# Patient Record
Sex: FEMALE | Race: WHITE | Employment: OTHER | URBAN - METROPOLITAN AREA
[De-identification: names, ages, dates, MRNs, and addresses within clinical notes are randomized per-mention and may not be internally consistent; named-entity substitution may affect disease eponyms.]

---

## 2017-02-09 ENCOUNTER — OP HISTORICAL/CONVERTED ENCOUNTER (OUTPATIENT)
Dept: OTHER | Age: 67
End: 2017-02-09

## 2017-05-18 ENCOUNTER — HOSPITAL ENCOUNTER (OUTPATIENT)
Dept: PREADMISSION TESTING | Age: 67
Discharge: HOME | End: 2017-05-18
Payer: MEDICARE

## 2017-05-18 VITALS
TEMPERATURE: 98.2 F | HEIGHT: 66 IN | SYSTOLIC BLOOD PRESSURE: 123 MMHG | HEART RATE: 72 BPM | DIASTOLIC BLOOD PRESSURE: 63 MMHG | OXYGEN SATURATION: 97 % | WEIGHT: 186.29 LBS | RESPIRATION RATE: 16 BRPM | BODY MASS INDEX: 29.94 KG/M2

## 2017-05-18 LAB
ABO + RH BLD: NORMAL
ALBUMIN SERPL BCP-MCNC: 4 G/DL (ref 3.5–5)
ALBUMIN/GLOB SERPL: 1.2 {RATIO} (ref 1.1–2.2)
ALP SERPL-CCNC: 56 U/L (ref 45–117)
ALT SERPL-CCNC: 21 U/L (ref 12–78)
ANION GAP BLD CALC-SCNC: 10 MMOL/L (ref 5–15)
APPEARANCE UR: CLEAR
APTT PPP: 34 SEC (ref 22.1–32.5)
AST SERPL W P-5'-P-CCNC: 15 U/L (ref 15–37)
ATRIAL RATE: 234 BPM
BACTERIA URNS QL MICRO: NEGATIVE /HPF
BASOPHILS # BLD AUTO: 0 K/UL (ref 0–0.1)
BASOPHILS # BLD: 1 % (ref 0–1)
BILIRUB SERPL-MCNC: 0.4 MG/DL (ref 0.2–1)
BILIRUB UR QL: NEGATIVE
BLOOD GROUP ANTIBODIES SERPL: NORMAL
BUN SERPL-MCNC: 15 MG/DL (ref 6–20)
BUN/CREAT SERPL: 20 (ref 12–20)
CALCIUM SERPL-MCNC: 9.3 MG/DL (ref 8.5–10.1)
CALCULATED R AXIS, ECG10: 40 DEGREES
CALCULATED T AXIS, ECG11: 107 DEGREES
CHLORIDE SERPL-SCNC: 103 MMOL/L (ref 97–108)
CO2 SERPL-SCNC: 27 MMOL/L (ref 21–32)
COLOR UR: NORMAL
CREAT SERPL-MCNC: 0.75 MG/DL (ref 0.55–1.02)
DIAGNOSIS, 93000: NORMAL
EOSINOPHIL # BLD: 0.2 K/UL (ref 0–0.4)
EOSINOPHIL NFR BLD: 2 % (ref 0–7)
EPITH CASTS URNS QL MICRO: NORMAL /LPF
ERYTHROCYTE [DISTWIDTH] IN BLOOD BY AUTOMATED COUNT: 15.5 % (ref 11.5–14.5)
EST. AVERAGE GLUCOSE BLD GHB EST-MCNC: 143 MG/DL
GLOBULIN SER CALC-MCNC: 3.4 G/DL (ref 2–4)
GLUCOSE SERPL-MCNC: 87 MG/DL (ref 65–100)
GLUCOSE UR STRIP.AUTO-MCNC: NEGATIVE MG/DL
HBA1C MFR BLD: 6.6 % (ref 4.2–6.3)
HCT VFR BLD AUTO: 43.6 % (ref 35–47)
HGB BLD-MCNC: 13.9 G/DL (ref 11.5–16)
HGB UR QL STRIP: NEGATIVE
HYALINE CASTS URNS QL MICRO: NORMAL /LPF (ref 0–5)
INR PPP: 2.3 (ref 0.9–1.1)
KETONES UR QL STRIP.AUTO: NEGATIVE MG/DL
LEUKOCYTE ESTERASE UR QL STRIP.AUTO: NEGATIVE
LYMPHOCYTES # BLD AUTO: 36 % (ref 12–49)
LYMPHOCYTES # BLD: 2.9 K/UL (ref 0.8–3.5)
MCH RBC QN AUTO: 29.1 PG (ref 26–34)
MCHC RBC AUTO-ENTMCNC: 31.9 G/DL (ref 30–36.5)
MCV RBC AUTO: 91.2 FL (ref 80–99)
MONOCYTES # BLD: 0.6 K/UL (ref 0–1)
MONOCYTES NFR BLD AUTO: 7 % (ref 5–13)
NEUTS SEG # BLD: 4.5 K/UL (ref 1.8–8)
NEUTS SEG NFR BLD AUTO: 54 % (ref 32–75)
NITRITE UR QL STRIP.AUTO: NEGATIVE
PH UR STRIP: 5 [PH] (ref 5–8)
PLATELET # BLD AUTO: 376 K/UL (ref 150–400)
POTASSIUM SERPL-SCNC: 4.9 MMOL/L (ref 3.5–5.1)
PROT SERPL-MCNC: 7.4 G/DL (ref 6.4–8.2)
PROT UR STRIP-MCNC: NEGATIVE MG/DL
PROTHROMBIN TIME: 23.4 SEC (ref 9–11.1)
Q-T INTERVAL, ECG07: 382 MS
QRS DURATION, ECG06: 86 MS
QTC CALCULATION (BEZET), ECG08: 432 MS
RBC # BLD AUTO: 4.78 M/UL (ref 3.8–5.2)
RBC #/AREA URNS HPF: NORMAL /HPF (ref 0–5)
SODIUM SERPL-SCNC: 140 MMOL/L (ref 136–145)
SP GR UR REFRACTOMETRY: 1.02 (ref 1–1.03)
SPECIMEN EXP DATE BLD: NORMAL
THERAPEUTIC RANGE,PTTT: ABNORMAL SECS (ref 58–77)
UA: UC IF INDICATED,UAUC: NORMAL
UROBILINOGEN UR QL STRIP.AUTO: 0.2 EU/DL (ref 0.2–1)
VENTRICULAR RATE, ECG03: 77 BPM
WBC # BLD AUTO: 8.3 K/UL (ref 3.6–11)
WBC URNS QL MICRO: NORMAL /HPF (ref 0–4)

## 2017-05-18 PROCEDURE — 85730 THROMBOPLASTIN TIME PARTIAL: CPT | Performed by: ORTHOPAEDIC SURGERY

## 2017-05-18 PROCEDURE — 36415 COLL VENOUS BLD VENIPUNCTURE: CPT | Performed by: ORTHOPAEDIC SURGERY

## 2017-05-18 PROCEDURE — 81001 URINALYSIS AUTO W/SCOPE: CPT | Performed by: ORTHOPAEDIC SURGERY

## 2017-05-18 PROCEDURE — 86900 BLOOD TYPING SEROLOGIC ABO: CPT | Performed by: ORTHOPAEDIC SURGERY

## 2017-05-18 PROCEDURE — 80053 COMPREHEN METABOLIC PANEL: CPT | Performed by: ORTHOPAEDIC SURGERY

## 2017-05-18 PROCEDURE — 83036 HEMOGLOBIN GLYCOSYLATED A1C: CPT | Performed by: ORTHOPAEDIC SURGERY

## 2017-05-18 PROCEDURE — 93005 ELECTROCARDIOGRAM TRACING: CPT

## 2017-05-18 PROCEDURE — 85025 COMPLETE CBC W/AUTO DIFF WBC: CPT | Performed by: ORTHOPAEDIC SURGERY

## 2017-05-18 PROCEDURE — 85610 PROTHROMBIN TIME: CPT | Performed by: ORTHOPAEDIC SURGERY

## 2017-05-18 RX ORDER — ATORVASTATIN CALCIUM 40 MG/1
40 TABLET, FILM COATED ORAL DAILY
COMMUNITY

## 2017-05-18 RX ORDER — OXYCODONE AND ACETAMINOPHEN 10; 325 MG/1; MG/1
TABLET ORAL
COMMUNITY
End: 2017-06-10

## 2017-05-18 RX ORDER — HYDROCODONE BITARTRATE 30 MG/1
30 TABLET, EXTENDED RELEASE ORAL DAILY
Status: ON HOLD | COMMUNITY
End: 2017-06-10

## 2017-05-18 RX ORDER — DIGOXIN 125 MCG
0.12 TABLET ORAL DAILY
COMMUNITY
End: 2021-02-25 | Stop reason: ALTCHOICE

## 2017-05-18 RX ORDER — EPINEPHRINE 0.3 MG/.3ML
0.3 INJECTION SUBCUTANEOUS
COMMUNITY

## 2017-05-18 RX ORDER — WARFARIN SODIUM 5 MG/1
5 TABLET ORAL
COMMUNITY
End: 2021-02-25 | Stop reason: ALTCHOICE

## 2017-05-18 NOTE — H&P
PAT Pre-Op History & Physical    Patient: Mirian Garcia                  MRN: [id number]          SSN: [id number]  YOB: 1950          Age: 69 y.o. Sex: female                Subjective:   Patient is a 69 y.o.  female who presents with history of chronic right shoulder pain that has been a problem for 3-4 years per patient report. Denies any trauma or injury that preceded the onset of her pain. Rates right shoulder pain 8/10 and states that it radiates into her upper right chest at times. Describes pain as a constant ache but can be sharp if she tries to raise her right arm. Patient is right hand dominant. Has failed steroid joint injections, NSAIDs, narcotic pain medication,and various topical medications (Lidocaine patches, \"tiger balm\", etc). Had left shoulder replaced in 8/2016 with good results. The patient was evaluated in the surgeon's office and it was determined that the most appropriate plan of care is to proceed with surgical intervention.   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 Authorizing Provider   oxyCODONE-acetaminophen (PERCOCET 10)  mg per tablet Take  by mouth every eight (8) hours as needed for Pain. Yes Historical Provider   atorvastatin (LIPITOR) 40 mg tablet Take 40 mg by mouth daily. Yes Historical Provider   warfarin (COUMADIN) 5 mg tablet Take 5 mg by mouth every Tuesday, Thursday, Saturday & Sunday. Yes Historical Provider   EPINEPHrine (EPIPEN) 0.3 mg/0.3 mL injection 0.3 mg by IntraMUSCular route once as needed. Yes Historical Provider   digoxin (DIGITEK) 0.125 mg tablet Take 0.125 mg by mouth daily. Yes Historical Provider   HYDROcodone bitartrate (HYSINGLA ER) 30 mg ER tablet Take 30 mg by mouth daily. *DO NOT CRUSH,CHEW, OR DISSOLVE TABLET*   Yes Historical Provider   WARFARIN SODIUM (COUMADIN PO) Take 6 mg by mouth every Monday, Wednesday, Friday. Yes Historical Provider   aspirin delayed-release 81 mg tablet Take 81 mg by mouth daily. Pt states she frequently does not take this medicine   Yes Historical Provider   b complex vitamins tablet Take 1 Tab by mouth daily. Yes Historical Provider   atenolol (TENORMIN) 50 mg tablet Take 50 mg by mouth daily. Indications: VENTRICULAR RATE CONTROL IN ATRIAL FIBRILLATION   Yes Historical Provider   diltiazem XR (DILACOR XR) 120 mg XR capsule Take  by mouth two (2) times a day. Yes Historical Provider   fenofibrate micronized (LOFIBRA) 200 mg capsule Take  by mouth nightly. Yes Historical Provider   metFORMIN (GLUCOPHAGE) 500 mg tablet Take 1,000 mg by mouth two (2) times daily (with meals). Yes Historical Provider   nitroglycerin (NITROSTAT) 0.4 mg SL tablet by SubLINGual route every five (5) minutes as needed for Chest Pain. Yes Historical Provider   coenzyme q10 10 mg cap Take 1 Cap by mouth daily.     Historical Provider HYDROcodone-acetaminophen (NORCO)  mg tablet Take 1 Tab by mouth every twelve (12) hours as needed for Pain. Historical Provider     Current Outpatient Prescriptions   Medication Sig    oxyCODONE-acetaminophen (PERCOCET 10)  mg per tablet Take  by mouth every eight (8) hours as needed for Pain.  atorvastatin (LIPITOR) 40 mg tablet Take 40 mg by mouth daily.  warfarin (COUMADIN) 5 mg tablet Take 5 mg by mouth every Tuesday, Thursday, Saturday & Sunday.  EPINEPHrine (EPIPEN) 0.3 mg/0.3 mL injection 0.3 mg by IntraMUSCular route once as needed.  digoxin (DIGITEK) 0.125 mg tablet Take 0.125 mg by mouth daily.  HYDROcodone bitartrate (HYSINGLA ER) 30 mg ER tablet Take 30 mg by mouth daily. *DO NOT CRUSH,CHEW, OR DISSOLVE TABLET*    WARFARIN SODIUM (COUMADIN PO) Take 6 mg by mouth every Monday, Wednesday, Friday.  aspirin delayed-release 81 mg tablet Take 81 mg by mouth daily. Pt states she frequently does not take this medicine    b complex vitamins tablet Take 1 Tab by mouth daily.  atenolol (TENORMIN) 50 mg tablet Take 50 mg by mouth daily. Indications: VENTRICULAR RATE CONTROL IN ATRIAL FIBRILLATION    diltiazem XR (DILACOR XR) 120 mg XR capsule Take  by mouth two (2) times a day.  fenofibrate micronized (LOFIBRA) 200 mg capsule Take  by mouth nightly.  metFORMIN (GLUCOPHAGE) 500 mg tablet Take 1,000 mg by mouth two (2) times daily (with meals).  nitroglycerin (NITROSTAT) 0.4 mg SL tablet by SubLINGual route every five (5) minutes as needed for Chest Pain.  coenzyme q10 10 mg cap Take 1 Cap by mouth daily.  HYDROcodone-acetaminophen (NORCO)  mg tablet Take 1 Tab by mouth every twelve (12) hours as needed for Pain. No current facility-administered medications for this encounter.        Allergies   Allergen Reactions    Bee Sting [Sting, Bee] Anaphylaxis     Carries an epi pen    Ciprofloxacin Hives, Rash and Itching    Flagyl [Metronidazole] Rash and Nausea and Vomiting    Pcn [Penicillins] Rash     Pt states she can take keflex without problem      Social History   Substance Use Topics    Smoking status: Never Smoker    Smokeless tobacco: Never Used    Alcohol use No      History   Drug Use No     Family History   Problem Relation Age of Onset    Dementia Mother     Heart Disease Mother     Coronary Artery Disease Mother      had open heart surgery    Diabetes Mother     Heart Disease Father     Heart Disease Maternal Grandmother     Heart Disease Maternal Grandfather     Heart Disease Paternal Grandmother     Heart Disease Paternal Grandfather     Heart Disease Brother          Review of Systems    Patient denies difficulty swallowing, mouth sores, or loose teeth. Patient denies any recent dental procedures or any planned prior to surgery. Patient denies chest pain, tightness, pain radiating down left arm, palpitations. Denies dizziness, visual disturbances, or lightheadedness. Patient denies shortness of breath, wheezing, cough, fever, or chills. Patient denies diarrhea, constipation, or abdominal pain. Patient denies urinary problems including dysuria, hesitancy, urgency, or incontinence. Denies skin breakdown, rashes, insect bites or open area. C/o right shoulder pain. Objective:   Patient Vitals for the past 24 hrs:   Temp Pulse Resp BP SpO2   17 1258 98.2 °F (36.8 °C) 72 16 123/63 97 %     Temp (24hrs), Av.2 °F (36.8 °C), Min:98.2 °F (36.8 °C), Max:98.2 °F (36.8 °C)    Body mass index is 30.07 kg/(m^2). Wt Readings from Last 1 Encounters:   17 84.5 kg (186 lb 4.6 oz)        Physical Exam:     General: Pleasant,  cooperative, no apparent distress, appears stated age. Eyes: Conjunctivae/corneas clear. EOMs intact. Nose: Nares normal.   Mouth/Throat: Lips, mucosa, and tongue normal. Missing multiple back teeth- gums normal.   Neck: Supple, symmetrical, trachea midline.     Back: Symmetric   Lungs: Clear to auscultation bilaterally. Heart: Regular rate and rhythm, S1, S2 normal. No murmur, click, rub or gallop. Abdomen: Soft, non-tender. Bowel sounds normal. No distention. Musculoskeletal:  Right shoulder ROM limited by discomfort. Extremities:  Extremities normal, atraumatic, no cyanosis or edema. Calves                                 supple, non tender to palpation. Pulses: 2+ and symmetric bilateral upper extremities. Cap. refill <2 seconds   Skin: Skin color, texture, turgor normal.  No rashes or lesions. Neurologic: CN II-XII grossly intact. Alert and oriented x3. Labs:   Recent Results (from the past 72 hour(s))   CBC WITH AUTOMATED DIFF    Collection Time: 05/18/17  2:49 PM   Result Value Ref Range    WBC 8.3 3.6 - 11.0 K/uL    RBC 4.78 3.80 - 5.20 M/uL    HGB 13.9 11.5 - 16.0 g/dL    HCT 43.6 35.0 - 47.0 %    MCV 91.2 80.0 - 99.0 FL    MCH 29.1 26.0 - 34.0 PG    MCHC 31.9 30.0 - 36.5 g/dL    RDW 15.5 (H) 11.5 - 14.5 %    PLATELET 699 990 - 353 K/uL    NEUTROPHILS 54 32 - 75 %    LYMPHOCYTES 36 12 - 49 %    MONOCYTES 7 5 - 13 %    EOSINOPHILS 2 0 - 7 %    BASOPHILS 1 0 - 1 %    ABS. NEUTROPHILS 4.5 1.8 - 8.0 K/UL    ABS. LYMPHOCYTES 2.9 0.8 - 3.5 K/UL    ABS. MONOCYTES 0.6 0.0 - 1.0 K/UL    ABS. EOSINOPHILS 0.2 0.0 - 0.4 K/UL    ABS. BASOPHILS 0.0 0.0 - 0.1 K/UL   METABOLIC PANEL, COMPREHENSIVE    Collection Time: 05/18/17  2:49 PM   Result Value Ref Range    Sodium 140 136 - 145 mmol/L    Potassium 4.9 3.5 - 5.1 mmol/L    Chloride 103 97 - 108 mmol/L    CO2 27 21 - 32 mmol/L    Anion gap 10 5 - 15 mmol/L    Glucose 87 65 - 100 mg/dL    BUN 15 6 - 20 MG/DL    Creatinine 0.75 0.55 - 1.02 MG/DL    BUN/Creatinine ratio 20 12 - 20      GFR est AA >60 >60 ml/min/1.73m2    GFR est non-AA >60 >60 ml/min/1.73m2    Calcium 9.3 8.5 - 10.1 MG/DL    Bilirubin, total 0.4 0.2 - 1.0 MG/DL    ALT (SGPT) 21 12 - 78 U/L    AST (SGOT) 15 15 - 37 U/L    Alk.  phosphatase 56 45 - 117 U/L    Protein, total 7.4 6.4 - 8.2 g/dL    Albumin 4.0 3.5 - 5.0 g/dL    Globulin 3.4 2.0 - 4.0 g/dL    A-G Ratio 1.2 1.1 - 2.2     HEMOGLOBIN A1C WITH EAG    Collection Time: 05/18/17  2:49 PM   Result Value Ref Range    Hemoglobin A1c 6.6 (H) 4.2 - 6.3 %    Est. average glucose 143 mg/dL   CULTURE, MRSA    Collection Time: 05/18/17  2:49 PM   Result Value Ref Range    Special Requests: NO SPECIAL REQUESTS      Culture result: MRSA NOT PRESENT   AT 14 HOURS       PROTHROMBIN TIME + INR    Collection Time: 05/18/17  2:49 PM   Result Value Ref Range    INR 2.3 (H) 0.9 - 1.1      Prothrombin time 23.4 (H) 9.0 - 11.1 sec   PTT    Collection Time: 05/18/17  2:49 PM   Result Value Ref Range    aPTT 34.0 (H) 22.1 - 32.5 sec    aPTT, therapeutic range     58.0 - 77.0 SECS   URINALYSIS W/ REFLEX CULTURE    Collection Time: 05/18/17  2:49 PM   Result Value Ref Range    Color YELLOW/STRAW      Appearance CLEAR CLEAR      Specific gravity 1.017 1.003 - 1.030      pH (UA) 5.0 5.0 - 8.0      Protein NEGATIVE  NEG mg/dL    Glucose NEGATIVE  NEG mg/dL    Ketone NEGATIVE  NEG mg/dL    Bilirubin NEGATIVE  NEG      Blood NEGATIVE  NEG      Urobilinogen 0.2 0.2 - 1.0 EU/dL    Nitrites NEGATIVE  NEG      Leukocyte Esterase NEGATIVE  NEG      WBC 0-4 0 - 4 /hpf    RBC 0-5 0 - 5 /hpf    Epithelial cells FEW FEW /lpf    Bacteria NEGATIVE  NEG /hpf    UA:UC IF INDICATED CULTURE NOT INDICATED BY UA RESULT CNI      Hyaline cast 0-2 0 - 5 /lpf   TYPE & SCREEN    Collection Time: 05/18/17  2:49 PM   Result Value Ref Range    Crossmatch Expiration 06/01/2017     ABO/Rh(D) A POSITIVE     Antibody screen NEG    EKG, 12 LEAD, INITIAL    Collection Time: 05/18/17  3:06 PM   Result Value Ref Range    Ventricular Rate 77 BPM    Atrial Rate 234 BPM    QRS Duration 86 ms    Q-T Interval 382 ms    QTC Calculation (Bezet) 432 ms    Calculated R Axis 40 degrees    Calculated T Axis 107 degrees    Diagnosis       Atrial fibrillation with premature ventricular or aberrantly conducted complexes  Nonspecific ST and T wave abnormality  Abnormal ECG  When compared with ECG of 27-JUL-2016 11:55,  Atrial fibrillation has replaced Sinus rhythm  Minor nonspecific st&t changes  Confirmed by Robby Mars M.D., Yossi James ([id number]) on 5/18/2017 4:30:42 PM         Assessment:     Right shoulder OA. Plan:     Scheduled for right total replacement. Labs and EKG done per surgeon's orders. Labs reviewed- unremarkable except PT/INR/PTT elevated but this expected as patient still on Coumadin. EKG shows a fib but patient has history of a fib and rate is controlled. MRSA positive- patient notified of results and RX sent to pharmacy. Received copy of note from PCP- Dr. Roderick Green- that gives instructions to stop Coumadin 5 days pre op and Aspirin 7 days pre op. Note also states that patient was cleared by cardiology. Requested cardiology note and clearance from office of Dr. Thomas Malagon in [place]. Copy of clearance on chart. Repeat PT/PTT/INR on day of surgery as patient on Coumadin.       Rosa Dumont NP

## 2017-05-18 NOTE — PERIOP NOTES
[facility]  PREOPERATIVE INSTRUCTIONS    Surgery Date:  Friday June 9   Surgery arrival time given by surgeon: NO   If no,[facility] [address] staff will call you between 4 PM- 8 PM the day before surgery with your arrival time. If your surgery is on a Monday, we will call you the preceding Friday. Please call [phone] after 8 PM if you did not receive your arrival time. 1. Please report at the designated time to the [address] [facility]. Bring your insurance card, photo identification, and any copayment ( if applicable). 2. You must have a responsible adult to drive you home. You need to have a responsible adult to stay with you the first 24 hours after surgery if you are going home the same day of your surgery and you should not drive a car for 24 hours following your surgery. 3. Nothing to eat or drink after midnight the night before surgery. This includes no water, gum, mints, coffee, juice, etc.  Please note special instructions, if applicable, below for medications. 4. MEDICATIONS TO TAKE THE MORNING OF SURGERY WITH A SIP OF WATER: diltiazem, digitek,oxycodone and other pain meds and atenolol  5. You MAY take your pain medication the day of surgery with a sip of water. 6. No alcoholic beverages 24 hours before or after your surgery. 7. If you are being admitted to the hospital,please leave personal belongings/luggage in your car until you have an assigned hospital room number. 8. Stop Aspirin and/or any non-steroidal anti-inflammatory drugs (i.e. Ibuprofen, Naproxen, Advil, Aleve) as directed by your prescribing physician. You may take Tylenol. Stop herbal supplements 1 week prior to  surgery. 9. If you are currently taking Plavix, Coumadin,or any other blood-thinning/anticoagulant medication contact your prescribing physician for instructions. 10. Please wear comfortable clothes. Wear your glasses instead of contacts. We ask that all money, jewelry and valuables be left at home.  Wear no make up, particularly hieu, the day of surgery. 11.  All body piercings, rings,and jewelry need to be removed and left at home. Please wear your hair loose or down. Please no pony-tails, buns, or any metal hair accessories. If you shower the morning of surgery, please do not apply any lotions, powders, or deodorants afterwards. Do not shave any body area within 24 hours of your surgery. 12. Please follow all instructions to avoid any potential surgical cancellation. 13. Should your physical condition change, (i.e. fever, cold, flu, etc.) please notify your surgeon as soon as possible. 14. It is important to be on time. If a situation occurs where you may be delayed, please call:  [phone] / [phone] on the day of surgery. 15. The Preadmission Testing staff can be reached at [phone]. 16. Special Instructions:  · Use Chlorhexidine Care Fusion wash and sponges 3 days prior to surgery as instructed. · Incentive spirometer given with instructions to practice at home and bring back to the hospital on the day of surgery. · [facility] will contact you if your Hemoglobin A1C is greater than 7.5. · Ensure/Glucerna  sample, nutritional information, and Ensure/Glucerna coupon given. · Pain pamphlet and Call Don't Fall reminder reviewed with patient. ·  parking is complimentary Monday - Friday 7 am - 5 pm  · Bring PTA Medication list day of surgery with the last doses taken documented  The patient was contacted  in person. She  verbalize  understanding of all instructions does not  need reinforcement.

## 2017-05-19 ENCOUNTER — ED HISTORICAL/CONVERTED ENCOUNTER (OUTPATIENT)
Dept: OTHER | Age: 67
End: 2017-05-19

## 2017-05-20 ENCOUNTER — OP HISTORICAL/CONVERTED ENCOUNTER (OUTPATIENT)
Dept: OTHER | Age: 67
End: 2017-05-20

## 2017-05-20 LAB
BACTERIA SPEC CULT: ABNORMAL
SERVICE CMNT-IMP: ABNORMAL

## 2017-05-22 RX ORDER — MUPIROCIN 20 MG/G
OINTMENT TOPICAL
Qty: 22 G | Refills: 0 | Status: SHIPPED | OUTPATIENT
Start: 2017-05-22 | End: 2021-02-25 | Stop reason: ALTCHOICE

## 2017-05-22 NOTE — PROGRESS NOTES
Notification of Positive MRSA and Treatment Ordered by Preadmission Testing Nurse Practitioner      Patient Name:  Izabela Mota     MRN: [id number]   : 1950  Surgeon:Dr. Jeanie Fitch  Date of surgery: 2017  Procedure: Right total shoulder arthroplasty    Allergies: Allergies   Allergen Reactions    Bee Sting [Sting, Bee] Anaphylaxis     Carries an epi pen    Ciprofloxacin Hives, Rash and Itching    Flagyl [Metronidazole] Rash and Nausea and Vomiting    Pcn [Penicillins] Rash     Pt states she can take keflex without problem       MRSA results: All Micro Results     Procedure Component Value Units Date/Time    MRSA CULTURE NARES [[id number]]  (Abnormal) Collected:  17 1449    Order Status:  Completed Specimen:  Nares Updated:  17 1302     Special Requests: NO SPECIAL REQUESTS        Culture result: MRSA PRESENT (A)         REPORT OF  MRSA PRESENT  CALLED TO AND READ BACK BY  DR. Uliess Calderón ON 17 AT 1300 BY VI   (A)                   Treatment ordered: Bactroban ointment 2%- apply intranasal twice daily for 5 days    Pre operative antibiotic will be changed to Vancomycin using weight based dosing (<80kg = 1gm, >80kg = 1.5gm) unless patient has allergy to Vancomycin.     Date patient notified of results / treatment prescribed: 2017    The Patient was instructed to add medication and date they began treatment to their \"Prior to Admission Medication\" list given to them in [address] [place] [address],

## 2017-05-23 ENCOUNTER — HOSPITAL ENCOUNTER (OUTPATIENT)
Dept: CT IMAGING | Age: 67
Discharge: HOME | End: 2017-05-23
Attending: ORTHOPAEDIC SURGERY
Payer: MEDICARE

## 2017-05-23 DIAGNOSIS — M19.011 ARTHRITIS OF RIGHT SHOULDER REGION: ICD-10-CM

## 2017-05-23 PROCEDURE — 73200 CT UPPER EXTREMITY W/O DYE: CPT

## 2017-06-07 RX ORDER — VANCOMYCIN/0.9 % SOD CHLORIDE 1.5G/250ML
1500 PLASTIC BAG, INJECTION (ML) INTRAVENOUS ONCE
Status: CANCELLED | OUTPATIENT
Start: 2017-06-09 | End: 2017-06-09

## 2017-06-08 ENCOUNTER — ANESTHESIA EVENT (OUTPATIENT)
Dept: SURGERY | Age: 67
DRG: 483 | End: 2017-06-08
Payer: MEDICARE

## 2017-06-09 ENCOUNTER — HOSPITAL ENCOUNTER (INPATIENT)
Age: 67
LOS: 1 days | Discharge: HOME | DRG: 483 | End: 2017-06-10
Attending: ORTHOPAEDIC SURGERY | Admitting: ORTHOPAEDIC SURGERY
Payer: MEDICARE

## 2017-06-09 ENCOUNTER — ANESTHESIA (OUTPATIENT)
Dept: SURGERY | Age: 67
DRG: 483 | End: 2017-06-09
Payer: MEDICARE

## 2017-06-09 ENCOUNTER — APPOINTMENT (OUTPATIENT)
Dept: GENERAL RADIOLOGY | Age: 67
DRG: 483 | End: 2017-06-09
Attending: PHYSICIAN ASSISTANT
Payer: MEDICARE

## 2017-06-09 PROBLEM — I48.20 CHRONIC A-FIB (HCC): Status: ACTIVE | Noted: 2017-06-09

## 2017-06-09 PROBLEM — N13.5 RETROPERITONEAL FIBROSIS: Status: ACTIVE | Noted: 2017-06-09

## 2017-06-09 PROBLEM — E11.9 DM (DIABETES MELLITUS) (HCC): Status: ACTIVE | Noted: 2017-06-09

## 2017-06-09 PROBLEM — M19.90 OSTEOARTHRITIS: Status: ACTIVE | Noted: 2017-06-09

## 2017-06-09 PROBLEM — I71.40 AAA (ABDOMINAL AORTIC ANEURYSM): Status: ACTIVE | Noted: 2017-06-09

## 2017-06-09 LAB
ABO + RH BLD: NORMAL
BLOOD GROUP ANTIBODIES SERPL: NORMAL
GLUCOSE BLD STRIP.AUTO-MCNC: 118 MG/DL (ref 65–100)
GLUCOSE BLD STRIP.AUTO-MCNC: 138 MG/DL (ref 65–100)
GLUCOSE BLD STRIP.AUTO-MCNC: 209 MG/DL (ref 65–100)
GLUCOSE BLD STRIP.AUTO-MCNC: 212 MG/DL (ref 65–100)
SERVICE CMNT-IMP: ABNORMAL
SPECIMEN EXP DATE BLD: NORMAL

## 2017-06-09 PROCEDURE — 77030020268 HC MISC GENERAL SUPPLY: Performed by: ORTHOPAEDIC SURGERY

## 2017-06-09 PROCEDURE — 77030018836 HC SOL IRR NACL ICUM -A: Performed by: ORTHOPAEDIC SURGERY

## 2017-06-09 PROCEDURE — 77030031139 HC SUT VCRL2 J&J -A: Performed by: ORTHOPAEDIC SURGERY

## 2017-06-09 PROCEDURE — 64415 NJX AA&/STRD BRCH PLXS IMG: CPT

## 2017-06-09 PROCEDURE — 77030032490 HC SLV COMPR SCD KNE COVD -B: Performed by: ORTHOPAEDIC SURGERY

## 2017-06-09 PROCEDURE — 36415 COLL VENOUS BLD VENIPUNCTURE: CPT | Performed by: ORTHOPAEDIC SURGERY

## 2017-06-09 PROCEDURE — 74011250636 HC RX REV CODE- 250/636: Performed by: PHYSICIAN ASSISTANT

## 2017-06-09 PROCEDURE — 76210000035 HC AMBSU PH I REC 1 TO 1.5 HR: Performed by: ORTHOPAEDIC SURGERY

## 2017-06-09 PROCEDURE — 76060000065 HC AMB SURG ANES 2.5 TO 3 HR: Performed by: ORTHOPAEDIC SURGERY

## 2017-06-09 PROCEDURE — 74011250636 HC RX REV CODE- 250/636

## 2017-06-09 PROCEDURE — 77030010507 HC ADH SKN DERMBND J&J -B

## 2017-06-09 PROCEDURE — 74011000250 HC RX REV CODE- 250: Performed by: ORTHOPAEDIC SURGERY

## 2017-06-09 PROCEDURE — 77030012429: Performed by: ORTHOPAEDIC SURGERY

## 2017-06-09 PROCEDURE — 77030010785: Performed by: ORTHOPAEDIC SURGERY

## 2017-06-09 PROCEDURE — 77030002922 HC SUT FBRWRE ARTH -B: Performed by: ORTHOPAEDIC SURGERY

## 2017-06-09 PROCEDURE — 74011000250 HC RX REV CODE- 250: Performed by: ANESTHESIOLOGY

## 2017-06-09 PROCEDURE — 77030003882 HC BIT DRL TWST BRSM -B: Performed by: ORTHOPAEDIC SURGERY

## 2017-06-09 PROCEDURE — 77030020788: Performed by: ORTHOPAEDIC SURGERY

## 2017-06-09 PROCEDURE — 74011250637 HC RX REV CODE- 250/637: Performed by: PHYSICIAN ASSISTANT

## 2017-06-09 PROCEDURE — 77030032497 HC WRP SHLDR WO BGS SOLM -B

## 2017-06-09 PROCEDURE — 77030011264 HC ELECTRD BLD EXT COVD -A: Performed by: ORTHOPAEDIC SURGERY

## 2017-06-09 PROCEDURE — 74011636637 HC RX REV CODE- 636/637: Performed by: INTERNAL MEDICINE

## 2017-06-09 PROCEDURE — 86900 BLOOD TYPING SEROLOGIC ABO: CPT | Performed by: ORTHOPAEDIC SURGERY

## 2017-06-09 PROCEDURE — 76030000022 HC AMB SURG 2.5 TO 3 HR INTENSV-TIER 1: Performed by: ORTHOPAEDIC SURGERY

## 2017-06-09 PROCEDURE — 73020 X-RAY EXAM OF SHOULDER: CPT

## 2017-06-09 PROCEDURE — 77030002933 HC SUT MCRYL J&J -A: Performed by: ORTHOPAEDIC SURGERY

## 2017-06-09 PROCEDURE — 74011250636 HC RX REV CODE- 250/636: Performed by: ANESTHESIOLOGY

## 2017-06-09 PROCEDURE — 77030010507 HC ADH SKN DERMBND J&J -B: Performed by: ORTHOPAEDIC SURGERY

## 2017-06-09 PROCEDURE — 0RRJ0JZ REPLACEMENT OF RIGHT SHOULDER JOINT WITH SYNTHETIC SUBSTITUTE, OPEN APPROACH: ICD-10-PCS | Performed by: ORTHOPAEDIC SURGERY

## 2017-06-09 PROCEDURE — 77030013837 HC NERV BLK KT BBMI -B

## 2017-06-09 PROCEDURE — 77030020782 HC GWN BAIR PAWS FLX 3M -B

## 2017-06-09 PROCEDURE — C1769 GUIDE WIRE: HCPCS | Performed by: ORTHOPAEDIC SURGERY

## 2017-06-09 PROCEDURE — C1776 JOINT DEVICE (IMPLANTABLE): HCPCS | Performed by: ORTHOPAEDIC SURGERY

## 2017-06-09 PROCEDURE — 74011000272 HC RX REV CODE- 272: Performed by: ORTHOPAEDIC SURGERY

## 2017-06-09 PROCEDURE — 82962 GLUCOSE BLOOD TEST: CPT

## 2017-06-09 PROCEDURE — 77030011640 HC PAD GRND REM COVD -A: Performed by: ORTHOPAEDIC SURGERY

## 2017-06-09 PROCEDURE — 77030018883 HC BLD SAW SAG4 STRY -B: Performed by: ORTHOPAEDIC SURGERY

## 2017-06-09 PROCEDURE — 77030008467 HC STPLR SKN COVD -B: Performed by: ORTHOPAEDIC SURGERY

## 2017-06-09 PROCEDURE — 74011250636 HC RX REV CODE- 250/636: Performed by: NURSE PRACTITIONER

## 2017-06-09 PROCEDURE — 65270000029 HC RM PRIVATE

## 2017-06-09 PROCEDURE — C1713 ANCHOR/SCREW BN/BN,TIS/BN: HCPCS | Performed by: ORTHOPAEDIC SURGERY

## 2017-06-09 PROCEDURE — 3E0T3CZ INTRODUCE REGIONAL ANESTH IN PERIPH NRV, PLEXI, PERC: ICD-10-PCS | Performed by: ANESTHESIOLOGY

## 2017-06-09 PROCEDURE — 77030029883 HC RETRV SUT ARTHSCP HOFFE BEAT -B: Performed by: ORTHOPAEDIC SURGERY

## 2017-06-09 PROCEDURE — 74011250637 HC RX REV CODE- 250/637: Performed by: ANESTHESIOLOGY

## 2017-06-09 DEVICE — CEMENT BNE SIMPLEX W/GENT -- 10/PK: Type: IMPLANTABLE DEVICE | Site: SHOULDER | Status: FUNCTIONAL

## 2017-06-09 DEVICE — COMPONENT TOT SHLDR ARTHRPLSTY [ARTHBSVS3] [ARTHREX INC]: Type: IMPLANTABLE DEVICE | Status: FUNCTIONAL

## 2017-06-09 RX ORDER — OXYCODONE HCL 10 MG/1
10 TABLET, FILM COATED, EXTENDED RELEASE ORAL EVERY 12 HOURS
Status: DISCONTINUED | OUTPATIENT
Start: 2017-06-09 | End: 2017-06-09

## 2017-06-09 RX ORDER — DEXTROSE 50 % IN WATER (D50W) INTRAVENOUS SYRINGE
12.5-25 AS NEEDED
Status: DISCONTINUED | OUTPATIENT
Start: 2017-06-09 | End: 2017-06-09 | Stop reason: SDUPTHER

## 2017-06-09 RX ORDER — LIDOCAINE HYDROCHLORIDE 10 MG/ML
0.1 INJECTION, SOLUTION EPIDURAL; INFILTRATION; INTRACAUDAL; PERINEURAL AS NEEDED
Status: DISCONTINUED | OUTPATIENT
Start: 2017-06-09 | End: 2017-06-09 | Stop reason: HOSPADM

## 2017-06-09 RX ORDER — OXYCODONE HYDROCHLORIDE 5 MG/1
10 TABLET ORAL
Status: DISCONTINUED | OUTPATIENT
Start: 2017-06-09 | End: 2017-06-09 | Stop reason: HOSPADM

## 2017-06-09 RX ORDER — METFORMIN HYDROCHLORIDE 500 MG/1
1000 TABLET ORAL 2 TIMES DAILY WITH MEALS
Status: DISCONTINUED | OUTPATIENT
Start: 2017-06-10 | End: 2017-06-10 | Stop reason: HOSPADM

## 2017-06-09 RX ORDER — DILTIAZEM HYDROCHLORIDE 60 MG/1
120 CAPSULE, EXTENDED RELEASE ORAL EVERY 12 HOURS
Status: DISCONTINUED | OUTPATIENT
Start: 2017-06-09 | End: 2017-06-10 | Stop reason: HOSPADM

## 2017-06-09 RX ORDER — VANCOMYCIN/0.9 % SOD CHLORIDE 1.5G/250ML
1500 PLASTIC BAG, INJECTION (ML) INTRAVENOUS EVERY 12 HOURS
Status: COMPLETED | OUTPATIENT
Start: 2017-06-09 | End: 2017-06-10

## 2017-06-09 RX ORDER — HYDROMORPHONE HYDROCHLORIDE 1 MG/ML
.25-1 INJECTION, SOLUTION INTRAMUSCULAR; INTRAVENOUS; SUBCUTANEOUS
Status: DISCONTINUED | OUTPATIENT
Start: 2017-06-09 | End: 2017-06-09 | Stop reason: HOSPADM

## 2017-06-09 RX ORDER — ROPIVACAINE HYDROCHLORIDE 5 MG/ML
INJECTION, SOLUTION EPIDURAL; INFILTRATION; PERINEURAL AS NEEDED
Status: DISCONTINUED | OUTPATIENT
Start: 2017-06-09 | End: 2017-06-09 | Stop reason: HOSPADM

## 2017-06-09 RX ORDER — INSULIN LISPRO 100 [IU]/ML
INJECTION, SOLUTION INTRAVENOUS; SUBCUTANEOUS ONCE
Status: DISCONTINUED | OUTPATIENT
Start: 2017-06-09 | End: 2017-06-09

## 2017-06-09 RX ORDER — OXYCODONE AND ACETAMINOPHEN 10; 325 MG/1; MG/1
TABLET ORAL
COMMUNITY
End: 2017-06-10

## 2017-06-09 RX ORDER — MAGNESIUM SULFATE 100 %
4 CRYSTALS MISCELLANEOUS AS NEEDED
Status: DISCONTINUED | OUTPATIENT
Start: 2017-06-09 | End: 2017-06-09 | Stop reason: SDUPTHER

## 2017-06-09 RX ORDER — FENTANYL CITRATE 50 UG/ML
INJECTION, SOLUTION INTRAMUSCULAR; INTRAVENOUS AS NEEDED
Status: DISCONTINUED | OUTPATIENT
Start: 2017-06-09 | End: 2017-06-09 | Stop reason: HOSPADM

## 2017-06-09 RX ORDER — ATENOLOL 50 MG/1
50 TABLET ORAL DAILY
Status: DISCONTINUED | OUTPATIENT
Start: 2017-06-10 | End: 2017-06-10 | Stop reason: HOSPADM

## 2017-06-09 RX ORDER — ATORVASTATIN CALCIUM 10 MG/1
40 TABLET, FILM COATED ORAL DAILY
Status: DISCONTINUED | OUTPATIENT
Start: 2017-06-09 | End: 2017-06-10 | Stop reason: HOSPADM

## 2017-06-09 RX ORDER — INSULIN LISPRO 100 [IU]/ML
INJECTION, SOLUTION INTRAVENOUS; SUBCUTANEOUS
Status: DISCONTINUED | OUTPATIENT
Start: 2017-06-09 | End: 2017-06-10 | Stop reason: HOSPADM

## 2017-06-09 RX ORDER — OXYCODONE HYDROCHLORIDE 5 MG/1
10 TABLET ORAL
Status: DISCONTINUED | OUTPATIENT
Start: 2017-06-09 | End: 2017-06-10 | Stop reason: HOSPADM

## 2017-06-09 RX ORDER — DIPHENHYDRAMINE HYDROCHLORIDE 50 MG/ML
12.5 INJECTION, SOLUTION INTRAMUSCULAR; INTRAVENOUS AS NEEDED
Status: DISCONTINUED | OUTPATIENT
Start: 2017-06-09 | End: 2017-06-09 | Stop reason: HOSPADM

## 2017-06-09 RX ORDER — MIDAZOLAM HYDROCHLORIDE 1 MG/ML
2 INJECTION, SOLUTION INTRAMUSCULAR; INTRAVENOUS
Status: DISCONTINUED | OUTPATIENT
Start: 2017-06-09 | End: 2017-06-09 | Stop reason: HOSPADM

## 2017-06-09 RX ORDER — OXYCODONE HCL 10 MG/1
10 TABLET, FILM COATED, EXTENDED RELEASE ORAL EVERY 12 HOURS
Status: DISCONTINUED | OUTPATIENT
Start: 2017-06-10 | End: 2017-06-10

## 2017-06-09 RX ORDER — MAGNESIUM SULFATE 100 %
4 CRYSTALS MISCELLANEOUS AS NEEDED
Status: DISCONTINUED | OUTPATIENT
Start: 2017-06-09 | End: 2017-06-10 | Stop reason: HOSPADM

## 2017-06-09 RX ORDER — DIGOXIN 125 MCG
0.12 TABLET ORAL DAILY
Status: DISCONTINUED | OUTPATIENT
Start: 2017-06-10 | End: 2017-06-10 | Stop reason: HOSPADM

## 2017-06-09 RX ORDER — SODIUM CHLORIDE, SODIUM LACTATE, POTASSIUM CHLORIDE, CALCIUM CHLORIDE 600; 310; 30; 20 MG/100ML; MG/100ML; MG/100ML; MG/100ML
125 INJECTION, SOLUTION INTRAVENOUS CONTINUOUS
Status: DISCONTINUED | OUTPATIENT
Start: 2017-06-09 | End: 2017-06-09 | Stop reason: HOSPADM

## 2017-06-09 RX ORDER — MIDAZOLAM HYDROCHLORIDE 1 MG/ML
INJECTION, SOLUTION INTRAMUSCULAR; INTRAVENOUS AS NEEDED
Status: DISCONTINUED | OUTPATIENT
Start: 2017-06-09 | End: 2017-06-09 | Stop reason: HOSPADM

## 2017-06-09 RX ORDER — SODIUM CHLORIDE 0.9 % (FLUSH) 0.9 %
5-10 SYRINGE (ML) INJECTION AS NEEDED
Status: DISCONTINUED | OUTPATIENT
Start: 2017-06-09 | End: 2017-06-10 | Stop reason: HOSPADM

## 2017-06-09 RX ORDER — FENTANYL CITRATE 50 UG/ML
25 INJECTION, SOLUTION INTRAMUSCULAR; INTRAVENOUS
Status: DISCONTINUED | OUTPATIENT
Start: 2017-06-09 | End: 2017-06-09 | Stop reason: HOSPADM

## 2017-06-09 RX ORDER — FLUMAZENIL 0.1 MG/ML
0.2 INJECTION INTRAVENOUS
Status: DISCONTINUED | OUTPATIENT
Start: 2017-06-09 | End: 2017-06-09 | Stop reason: HOSPADM

## 2017-06-09 RX ORDER — ACETAMINOPHEN 325 MG/1
650 TABLET ORAL
Status: DISCONTINUED | OUTPATIENT
Start: 2017-06-09 | End: 2017-06-10 | Stop reason: HOSPADM

## 2017-06-09 RX ORDER — ACETAMINOPHEN 325 MG/1
650 TABLET ORAL EVERY 6 HOURS
Status: DISCONTINUED | OUTPATIENT
Start: 2017-06-09 | End: 2017-06-09

## 2017-06-09 RX ORDER — WARFARIN SODIUM 5 MG/1
5 TABLET ORAL
Status: DISCONTINUED | OUTPATIENT
Start: 2017-06-10 | End: 2017-06-10 | Stop reason: HOSPADM

## 2017-06-09 RX ORDER — EPINEPHRINE 1 MG/ML
0.3 INJECTION, SOLUTION, CONCENTRATE INTRAVENOUS
Status: DISCONTINUED | OUTPATIENT
Start: 2017-06-09 | End: 2017-06-10 | Stop reason: HOSPADM

## 2017-06-09 RX ORDER — MUPIROCIN 20 MG/G
OINTMENT TOPICAL 2 TIMES DAILY
Status: DISCONTINUED | OUTPATIENT
Start: 2017-06-09 | End: 2017-06-10 | Stop reason: HOSPADM

## 2017-06-09 RX ORDER — HYDROCODONE BITARTRATE 30 MG/1
30 TABLET, EXTENDED RELEASE ORAL DAILY
Status: DISCONTINUED | OUTPATIENT
Start: 2017-06-10 | End: 2017-06-09

## 2017-06-09 RX ORDER — ASPIRIN 81 MG/1
81 TABLET ORAL DAILY
Status: DISCONTINUED | OUTPATIENT
Start: 2017-06-10 | End: 2017-06-10 | Stop reason: HOSPADM

## 2017-06-09 RX ORDER — KETOROLAC TROMETHAMINE 30 MG/ML
15 INJECTION, SOLUTION INTRAMUSCULAR; INTRAVENOUS
Status: DISCONTINUED | OUTPATIENT
Start: 2017-06-09 | End: 2017-06-09

## 2017-06-09 RX ORDER — OXYCODONE HYDROCHLORIDE 5 MG/1
5 TABLET ORAL
Status: DISCONTINUED | OUTPATIENT
Start: 2017-06-09 | End: 2017-06-10 | Stop reason: HOSPADM

## 2017-06-09 RX ORDER — VANCOMYCIN/0.9 % SOD CHLORIDE 1.5G/250ML
1500 PLASTIC BAG, INJECTION (ML) INTRAVENOUS ONCE
Status: COMPLETED | OUTPATIENT
Start: 2017-06-09 | End: 2017-06-09

## 2017-06-09 RX ORDER — ACETAMINOPHEN 325 MG/1
975 TABLET ORAL ONCE
Status: COMPLETED | OUTPATIENT
Start: 2017-06-09 | End: 2017-06-09

## 2017-06-09 RX ORDER — SODIUM CHLORIDE 9 MG/ML
125 INJECTION, SOLUTION INTRAVENOUS CONTINUOUS
Status: DISCONTINUED | OUTPATIENT
Start: 2017-06-09 | End: 2017-06-10 | Stop reason: HOSPADM

## 2017-06-09 RX ORDER — SODIUM CHLORIDE 0.9 % (FLUSH) 0.9 %
5-10 SYRINGE (ML) INJECTION EVERY 8 HOURS
Status: DISCONTINUED | OUTPATIENT
Start: 2017-06-09 | End: 2017-06-10 | Stop reason: HOSPADM

## 2017-06-09 RX ORDER — DEXTROSE 50 % IN WATER (D50W) INTRAVENOUS SYRINGE
12.5-25 AS NEEDED
Status: DISCONTINUED | OUTPATIENT
Start: 2017-06-09 | End: 2017-06-10 | Stop reason: HOSPADM

## 2017-06-09 RX ORDER — CELECOXIB 100 MG/1
200 CAPSULE ORAL EVERY 12 HOURS
Status: DISCONTINUED | OUTPATIENT
Start: 2017-06-09 | End: 2017-06-09

## 2017-06-09 RX ORDER — NALOXONE HYDROCHLORIDE 0.4 MG/ML
0.2 INJECTION, SOLUTION INTRAMUSCULAR; INTRAVENOUS; SUBCUTANEOUS
Status: DISCONTINUED | OUTPATIENT
Start: 2017-06-09 | End: 2017-06-09 | Stop reason: HOSPADM

## 2017-06-09 RX ORDER — FENOFIBRATE 145 MG/1
145 TABLET, COATED ORAL
Status: DISCONTINUED | OUTPATIENT
Start: 2017-06-09 | End: 2017-06-10 | Stop reason: HOSPADM

## 2017-06-09 RX ORDER — MORPHINE SULFATE 2 MG/ML
2 INJECTION, SOLUTION INTRAMUSCULAR; INTRAVENOUS
Status: DISPENSED | OUTPATIENT
Start: 2017-06-09 | End: 2017-06-10

## 2017-06-09 RX ORDER — B-COMPLEX WITH VITAMIN C
1 TABLET ORAL DAILY
Status: DISCONTINUED | OUTPATIENT
Start: 2017-06-10 | End: 2017-06-10 | Stop reason: HOSPADM

## 2017-06-09 RX ADMIN — INSULIN LISPRO 2 UNITS: 100 INJECTION, SOLUTION INTRAVENOUS; SUBCUTANEOUS at 21:31

## 2017-06-09 RX ADMIN — WARFARIN SODIUM 6 MG: 5 TABLET ORAL at 18:34

## 2017-06-09 RX ADMIN — SODIUM CHLORIDE 125 ML/HR: 900 INJECTION, SOLUTION INTRAVENOUS at 18:31

## 2017-06-09 RX ADMIN — MIDAZOLAM HYDROCHLORIDE 1 MG: 1 INJECTION, SOLUTION INTRAMUSCULAR; INTRAVENOUS at 08:53

## 2017-06-09 RX ADMIN — VANCOMYCIN HYDROCHLORIDE 1500 MG: 10 INJECTION, POWDER, LYOPHILIZED, FOR SOLUTION INTRAVENOUS at 18:31

## 2017-06-09 RX ADMIN — FENTANYL CITRATE 50 MCG: 50 INJECTION, SOLUTION INTRAMUSCULAR; INTRAVENOUS at 08:50

## 2017-06-09 RX ADMIN — OXYCODONE HYDROCHLORIDE 10 MG: 5 TABLET ORAL at 15:59

## 2017-06-09 RX ADMIN — Medication 10 ML: at 21:31

## 2017-06-09 RX ADMIN — ATORVASTATIN CALCIUM 40 MG: 10 TABLET, FILM COATED ORAL at 21:30

## 2017-06-09 RX ADMIN — Medication 2 MG: at 21:30

## 2017-06-09 RX ADMIN — ACETAMINOPHEN 975 MG: 325 TABLET ORAL at 08:39

## 2017-06-09 RX ADMIN — MUPIROCIN: 20 OINTMENT TOPICAL at 18:49

## 2017-06-09 RX ADMIN — MIDAZOLAM HYDROCHLORIDE 2 MG: 1 INJECTION, SOLUTION INTRAMUSCULAR; INTRAVENOUS at 08:50

## 2017-06-09 RX ADMIN — BUPIVACAINE HYDROCHLORIDE 6 ML/HR: 2.5 INJECTION, SOLUTION EPIDURAL; INFILTRATION; INTRACAUDAL; PERINEURAL at 12:48

## 2017-06-09 RX ADMIN — MIDAZOLAM HYDROCHLORIDE 1 MG: 1 INJECTION, SOLUTION INTRAMUSCULAR; INTRAVENOUS at 08:55

## 2017-06-09 RX ADMIN — INSULIN LISPRO 3 UNITS: 100 INJECTION, SOLUTION INTRAVENOUS; SUBCUTANEOUS at 18:36

## 2017-06-09 RX ADMIN — FENTANYL CITRATE 50 MCG: 50 INJECTION, SOLUTION INTRAMUSCULAR; INTRAVENOUS at 08:55

## 2017-06-09 RX ADMIN — ROPIVACAINE HYDROCHLORIDE 30 ML: 5 INJECTION, SOLUTION EPIDURAL; INFILTRATION; PERINEURAL at 09:00

## 2017-06-09 RX ADMIN — FENOFIBRATE 145 MG: 145 TABLET ORAL at 21:30

## 2017-06-09 RX ADMIN — DILTIAZEM HYDROCHLORIDE 120 MG: 60 CAPSULE, EXTENDED RELEASE ORAL at 18:47

## 2017-06-09 RX ADMIN — MIDAZOLAM HYDROCHLORIDE 1 MG: 1 INJECTION, SOLUTION INTRAMUSCULAR; INTRAVENOUS at 08:51

## 2017-06-09 RX ADMIN — CELECOXIB 200 MG: 100 CAPSULE ORAL at 08:39

## 2017-06-09 RX ADMIN — VANCOMYCIN HYDROCHLORIDE 1500 MG: 10 INJECTION, POWDER, LYOPHILIZED, FOR SOLUTION INTRAVENOUS at 08:35

## 2017-06-09 RX ADMIN — HYDROMORPHONE HYDROCHLORIDE 1 MG: 1 INJECTION, SOLUTION INTRAMUSCULAR; INTRAVENOUS; SUBCUTANEOUS at 13:27

## 2017-06-09 NOTE — ANESTHESIA PREPROCEDURE EVALUATION
Anesthetic History   No history of anesthetic complications            Review of Systems / Medical History  Patient summary reviewed, nursing notes reviewed and pertinent labs reviewed    Pulmonary  Within defined limits                 Neuro/Psych       CVA (decreased vision - right peripheral)       Cardiovascular            Dysrhythmias : atrial fibrillation  Hyperlipidemia    Exercise tolerance: >4 METS     GI/Hepatic/Renal  Within defined limits              Endo/Other    Diabetes    Obesity and arthritis     Other Findings   Comments: +chronic pain  +retroperitoneal fibrosis           Physical Exam    Airway  Mallampati: III  TM Distance: < 4 cm  Neck ROM: normal range of motion   Mouth opening: Normal     Cardiovascular  Regular rate and rhythm,  S1 and S2 normal,  no murmur, click, rub, or gallop  Rhythm: regular  Rate: normal         Dental    Dentition: Poor dentition     Pulmonary  Breath sounds clear to auscultation               Abdominal  GI exam deferred       Other Findings            Anesthetic Plan    ASA: 3  Anesthesia type: general      Post-op pain plan if not by surgeon: peripheral nerve block continuous    Induction: Intravenous  Anesthetic plan and risks discussed with: Patient

## 2017-06-09 NOTE — IP AVS SNAPSHOT
[address] 
[phone] Patient: Shaheen Ward MRN: [id number] QNK:[id number] You are allergic to the following Allergen Reactions Bee Sting (Sting, Bee) Anaphylaxis Carries an epi pen Ciprofloxacin Hives Rash Itching Flagyl (Metronidazole) Rash Nausea and Vomiting Pcn (Penicillins) Rash Pt states she can take keflex without problem Recent Documentation Height Weight BMI OB Status Smoking Status 1.676 m 84.5 kg 30.07 kg/m2 Postmenopausal Never Smoker Emergency Contacts Name Discharge Info Relation Home Work Mobile Esau Rolle DISCHARGE CAREGIVER [3] Spouse [3] [phone] About your hospitalization You were admitted on:  June 9, 2017 You last received care in the:  [facility] 4M POST SURG ORT 1 You were discharged on:  Ivonne 10, 2017 Unit phone number:  [phone] Why you were hospitalized Your primary diagnosis was:  Not on File Your diagnoses also included:  Osteoarthritis, Dm (Diabetes Mellitus) (Hcc), Retroperitoneal Fibrosis, Chronic A-Fib (Hcc), Aaa (Abdominal Aortic Aneurysm) (Hcc) Providers Seen During Your Hospitalizations Provider Role Specialty Primary office phone Elodia Walters MD Attending Provider Orthopedic Surgery [phone] Coral Tesfaye MD Attending Provider Internal Medicine [phone] Your Primary Care Physician (PCP) Primary Care Physician Office Phone Office Fax OTHER, PHYS ** None ** ** None ** Follow-up Information Follow up With Details Comments Contact Info Rand Gonzalez MD   Patient can only remember the practice name and not the physician Current Discharge Medication List  
  
START taking these medications Dose & Instructions Dispensing Information Comments Morning Noon Evening Bedtime ondansetron 4 mg disintegrating tablet Commonly known as:  ZOFRAN ODT Your last dose was: Your next dose is:    
   
   
 Dose:  4 mg Take 1 Tab by mouth every eight (8) hours as needed for Nausea. Quantity:  30 Tab Refills:  0  
     
   
   
   
  
 oxyCODONE IR 5 mg immediate release tablet Commonly known as:  Elana Doty Your last dose was: Your next dose is:    
   
   
 Dose:  5-10 mg Take 1-2 Tabs by mouth every three (3) hours as needed. Max Daily Amount: 80 mg.  
 Quantity:  90 Tab Refills:  0 CONTINUE these medications which have CHANGED Dose & Instructions Dispensing Information Comments Morning Noon Evening Bedtime HYDROcodone bitartrate 30 mg ER tablet Commonly known as:  HYSINGLA ER What changed:  how much to take Your last dose was: Your next dose is:    
   
   
 Dose:  60 mg Take 2 Tabs by mouth daily. Max Daily Amount: 60 mg. *DO NOT CRUSH,CHEW, OR DISSOLVE TABLET* Quantity:  14 Tab Refills:  0 CONTINUE these medications which have NOT CHANGED Dose & Instructions Dispensing Information Comments Morning Noon Evening Bedtime  
 aspirin delayed-release 81 mg tablet Your last dose was: Your next dose is:    
   
   
 Dose:  81 mg Take 81 mg by mouth daily. Pt states she frequently does not take this medicine Refills:  0  
     
   
   
   
  
 atenolol 50 mg tablet Commonly known as:  TENORMIN Your last dose was: Your next dose is:    
   
   
 Dose:  50 mg Take 50 mg by mouth daily. Indications: VENTRICULAR RATE CONTROL IN ATRIAL FIBRILLATION Refills:  0  
     
   
   
   
  
 atorvastatin 40 mg tablet Commonly known as:  LIPITOR Your last dose was: Your next dose is:    
   
   
 Dose:  40 mg Take 40 mg by mouth daily. Refills:  0  
     
   
   
   
  
 b complex vitamins tablet Your last dose was: Your next dose is:    
   
   
 Dose:  1 Tab Take 1 Tab by mouth daily. Refills:  0  
     
   
   
   
  
 coenzyme q10 10 mg Cap Your last dose was: Your next dose is:    
   
   
 Dose:  1 Cap Take 1 Cap by mouth daily. Refills:  0  
     
   
   
   
  
 * COUMADIN PO Your last dose was: Your next dose is:    
   
   
 Dose:  6 mg Take 6 mg by mouth every Monday, Wednesday, Friday. Refills:  0  
     
   
   
   
  
 * warfarin 5 mg tablet Commonly known as:  COUMADIN Your last dose was: Your next dose is:    
   
   
 Dose:  5 mg Take 5 mg by mouth every Tuesday, Thursday, Saturday & Sunday. Refills:  0 DIGITEK 0.125 mg tablet Generic drug:  digoxin Your last dose was: Your next dose is:    
   
   
 Dose:  0.125 mg Take 0.125 mg by mouth daily. Refills:  0  
     
   
   
   
  
 dilTIAZem  mg XR capsule Commonly known as:  DILACOR XR Your last dose was: Your next dose is: Take  by mouth two (2) times a day. Refills:  0 EPINEPHrine 0.3 mg/0.3 mL injection Commonly known as:  Ezio Hill Your last dose was: Your next dose is:    
   
   
 Dose:  0.3 mg  
0.3 mg by IntraMUSCular route once as needed. Refills:  0  
     
   
   
   
  
 fenofibrate micronized 200 mg capsule Commonly known as:  LOFIBRA Your last dose was: Your next dose is: Take  by mouth nightly. Refills:  0  
     
   
   
   
  
 metFORMIN 500 mg tablet Commonly known as:  GLUCOPHAGE Your last dose was: Your next dose is:    
   
   
 Dose:  1000 mg Take 1,000 mg by mouth two (2) times daily (with meals). Refills:  0  
     
   
   
   
  
 mupirocin 2 % ointment Commonly known as:  [employer] Your last dose was: Your next dose is: Apply pea size amount inside edge of each nostril with Q tip. Squeeze nostrils together to spread medication. Use twice daily x5 days Quantity:  22 g Refills:  0  
     
   
   
   
  
 nitroglycerin 0.4 mg SL tablet Commonly known as:  NITROSTAT Your last dose was: Your next dose is:    
   
   
 by SubLINGual route every five (5) minutes as needed for Chest Pain. Refills:  0  
     
   
   
   
  
 * Notice: This list has 2 medication(s) that are the same as other medications prescribed for you. Read the directions carefully, and ask your doctor or other care provider to review them with you. STOP taking these medications HYDROcodone-acetaminophen  mg tablet Commonly known as:  NORCO  
   
  
 oxyCODONE-acetaminophen  mg per tablet Commonly known as:  PERCOCET 10 PERCOCET  mg per tablet Generic drug:  oxyCODONE-acetaminophen Where to Get Your Medications Information on where to get these meds will be given to you by the nurse or doctor. ! Ask your nurse or doctor about these medications HYDROcodone bitartrate 30 mg ER tablet  
 ondansetron 4 mg disintegrating tablet  
 oxyCODONE IR 5 mg immediate release tablet Discharge Instructions Hard copy in chart Discharge Orders None [facility] Announcement We are excited to announce that we are making your provider's discharge notes available to you in [employer]. You will see these notes when they are completed and signed by the physician that discharged you from your recent hospital stay. If you have any questions or concerns about any information you see in Be[employer], please call the Health Information Department where you were seen or reach out to your Primary Care Provider for more information about your plan of care. Introducing [facility]!    
 Clair Kovacs introduces Be[employer] patient portal. Now you can access parts of your medical record, email your doctor's office, and request medication refills online. 1. In your internet browser, go to [email or web address] 2. Click on the First Time User? Click Here link in the Sign In box. You will see the New Member Sign Up page. 3. Enter your [employer] Access Code exactly as it appears below. You will not need to use this code after youve completed the sign-up process. If you do not sign up before the expiration date, you must request a new code. · [employer] Access Code: [email or web address] Expires: 7/11/2017  3:18 PM 
 
4. Enter the last four digits of your Social Security Number (xxxx) and Date of Birth (mm/dd/yyyy) as indicated and click Submit. You will be taken to the next sign-up page. 5. Create a [employer] ID. This will be your [employer] login ID and cannot be changed, so think of one that is secure and easy to remember. 6. Create a [employer] password. You can change your password at any time. 7. Enter your Password Reset Question and Answer. This can be used at a later time if you forget your password. 8. Enter your e-mail address. You will receive e-mail notification when new information is available in [address] 9. Click Sign Up. You can now view and download portions of your medical record. 10. Click the Download Summary menu link to download a portable copy of your medical information. If you have questions, please visit the Frequently Asked Questions section of the [employer] website. Remember, [employer] is NOT to be used for urgent needs. For medical emergencies, dial 911. Now available from your iPhone and Android! General Information Please provide this summary of care documentation to your next provider. Patient Signature:  ____________________________________________________________ Date:  ____________________________________________________________  
  
Zehra Lightning  Provider Signature: ____________________________________________________________ Date:  ____________________________________________________________

## 2017-06-09 NOTE — BRIEF OP NOTE
BRIEF OPERATIVE NOTE    Date of Procedure: 6/9/2017   Preoperative Diagnosis: RIGHT SHOULDER OA  Postoperative Diagnosis: RIGHT SHOULDER OA    Procedure(s):  RIGHT TOTAL SHOULDER REPLACEMENT  Surgeon(s) and Role:     * Jc Dubon MD - Primary         Assistant Staff:       Surgical Staff:  Circ-1: Samantha Wei RN  Scrub Tech-1: Kobi Crespo  Surg Asst-1: Kia Kendall RN  Event Time In   Incision Start 1000   Incision Close      Anesthesia: General   Estimated Blood Loss: minimal  Specimens: * No specimens in log *   Findings: RIGHT SHOULDER OA   Complications: none  Implants:   Implant Name Type Inv.  Item Serial No.  Lot No. LRB No. Used Action   arthrex universal apex suture kit Other  NA  [id number] Right 1 Implanted   CEMENT BNE SIMPLEX W/GENT -- 10/PK - [id number]  CEMENT BNE SIMPLEX W/GENT -- 10/PK  [employer] [facility] [id number] Right 1 Implanted   arthrex universal vaultlock glenoid medium Joint Component  NA  [id number] Right 1 Implanted   arthrex universal apex humeral stem 6mm Joint Component  NA  [id number] Right 1 Implanted   arthrex universal II humeral head Joint Component   NA   [id number] Right 1 Implanted

## 2017-06-09 NOTE — ANESTHESIA PROCEDURE NOTES
Peripheral Block    Start time: 6/9/2017 8:50 AM  End time: 6/9/2017 9:00 AM  Performed by: Michael Lopez by: Katarzyna Ferguson       Pre-procedure: Indications: at surgeon's request and post-op pain management    Preanesthetic Checklist: patient identified, risks and benefits discussed, timeout performed and anesthesia consent given    Timeout Time: 08:50          Block Type:   Block Type:   Interscalene  Laterality:  Right  Monitoring:  Continuous pulse ox, frequent vital sign checks, heart rate, responsive to questions and oxygen  Injection Technique:  Continuous  Procedures: ultrasound guided    Patient Position: supine  Prep: chlorhexidine    Location:  Interscalene  Needle Type:  Tuohy  Needle Gauge:  18 G  Needle Localization:  Nerve stimulator and ultrasound guidance  Medication Injected:  0.5%  ropivacaine  Volume (mL):  30    Assessment:  Number of attempts:  1  Injection Assessment:  Incremental injection every 5 mL, local visualized surrounding nerve on ultrasound, negative aspiration for blood and no intravascular symptoms  Patient tolerance:  Patient tolerated the procedure well with no immediate complications

## 2017-06-09 NOTE — PROGRESS NOTES
6/9/2017 4:17 PM Met with pt and pt's son. Charted address and phone numbers confirmed. [facility] and [employer] provided to pt, pt unable to sign. Pt will have her  and son at home after discharge to assist. Pt has been to outpatient PT through [facility] and plans to return after discharge. Pt has rx coverage and fills her scripts at [facility]. Pt owns a RW, elevated toilet seat and cane. Pt received assistance with adls and did not drive prior to admission. Pt's  and son transport pt to [facility] and will transport pt home. Pt is under a pain contract with Dr. West Grover with [facility]([phone]). Pt requested Dr. West Grover be contacted regarding pt's discharging pain medications. CM called Dr. Pierre Randolph office, spoke with Bev Joy who reported she will contact Dr. Emilie Gu office. No discharge needs identified. CM will follow. CHRIS Valentin   Care Management Interventions  PCP Verified by CM: Yes (Brigitte Davila )  Mode of Transport at Discharge:  Other (see comment) (Pt's son or )  Physical Therapy Consult: Yes  Occupational Therapy Consult: Yes  Current Support Network: Lives with Spouse, Own Home  Confirm Follow Up Transport: Family  Discharge Location  Discharge Placement: Home with family assistance

## 2017-06-09 NOTE — CONSULTS
[place] [address]  [phone]    Hospitalist Consult Note      NAME:  Sam Gillis   :   1950   MRN:  [id number]     Requesting Physician Dianne Womack MD   Reason for Consult:  Medical management      PCP:  Antonio Rowe MD     Date/Time:  2017 2:45 PM       Assessment and plan:   1. Osteoarthritis (2017) RT shoulder S/P arthoplasty on 47. Management per orthopedic including DVT prophylaxis, pain management and discharge planning. 2.  DM (diabetes mellitus) ([facility] [address] (2017). Continue diabetic diet and metformin. Recent A1C 6.6. Cover with SSI    3. Chronic a-fib ([facility] [address] (2017). Stable rate. Continue ASA, atenolol, digoxin, diltiazem. 4.  Retroperitoneal fibrosis (2017)/ AAA (abdominal aortic aneurysm) ([facility] [address] (2017). Pt has close follow up with cardiology and vascular as an outpatient. On initial diagnosis, pt was evaluated and managed at [facility]. 5.  Hyperlipidemia. On statin. Thank you for the consult. Will follow up along with you. Subjective:     CHIEF COMPLAINT: RT shoulder pain      HISTORY OF PRESENT ILLNESS:     Ms. River Mckeon is a 69 y.o.  female who is admitted to the orthopedics Service with RT shoulder osteoarthritis S/P arthoplasty. We are asked to evaluate for medical managment. Ms. Brendan Ojeda PMH of DM, afib, retroperitoneal fibrosis, AAA was admitted for elective RT shoulder arthoplasty. Pt has been having RT shoulder pain for about 5 years and tried local injection of steroid, NSAID and narcotics with temporal effect. Shoulder pain is achy and sometimes becomes sharp. Has limited ROM and due to that she stopped driving. The pain radiates to the +RT siide of the chest. It is moderate to severe in intensity. Got worse with movement.        Past Medical History:   Diagnosis Date    A-fib ([facility] [address]     Aneurysm ([facility] [address]      hx decending aortic aneurysm  states \"not there now\"  present on 2013 duplex imaging\"     Arrhythmia     \"fast heart beat\"    Arthritis     Autoimmune disease ([facility] [address]     retroperitoneal fibrosis-  seen Araseli Rodriguez for Rx    Burning with urination     Chronic pain     sees pain management MD for chronic back pain Dr Jabier Cerda 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    Incontinence in female     Muscle weakness     Retroperitoneal fibrosis     aorta    Stroke ([facility] [address] 01/25/2016    only deficit is loss of right peripheral visit    Tinnitus         Past Surgical History:   Procedure Laterality Date    HX ADENOIDECTOMY      childhood    HX GYN  2010    tubes & ovaries removed    HX HEART CATHETERIZATION      X2- no blockages found    HX SHOULDER REPLACEMENT Left 2016    HX TONSILLECTOMY      childhood       Social History   Substance Use Topics    Smoking status: Never Smoker    Smokeless tobacco: Never Used    Alcohol use No        Family History   Problem Relation Age of Onset    Dementia Mother     Heart Disease Mother     Coronary Artery Disease Mother      had open heart surgery    Diabetes Mother     Heart Disease Father     Heart Disease Maternal Grandmother     Heart Disease Maternal Grandfather     Heart Disease Paternal Grandmother     Heart Disease Paternal Grandfather     Heart Disease Brother         Allergies   Allergen Reactions    Bee Sting [Sting, Bee] Anaphylaxis     Carries an epi pen    Ciprofloxacin Hives, Rash and Itching    Flagyl [Metronidazole] Rash and Nausea and Vomiting    Pcn [Penicillins] Rash     Pt states she can take keflex without problem        Prior to Admission medications    Medication Sig Start Date End Date Taking? Authorizing Provider   oxyCODONE-acetaminophen (PERCOCET)  mg per tablet Take  by mouth.    Yes Historical Provider   oxyCODONE-acetaminophen (PERCOCET 10)  mg per tablet Take  by mouth every eight (8) hours as needed for Pain. Yes Historical Provider   atorvastatin (LIPITOR) 40 mg tablet Take 40 mg by mouth daily. Yes Historical Provider   EPINEPHrine (EPIPEN) 0.3 mg/0.3 mL injection 0.3 mg by IntraMUSCular route once as needed. Yes Historical Provider   digoxin (DIGITEK) 0.125 mg tablet Take 0.125 mg by mouth daily. Yes Historical Provider   HYDROcodone bitartrate (HYSINGLA ER) 30 mg ER tablet Take 30 mg by mouth daily. *DO NOT CRUSH,CHEW, OR DISSOLVE TABLET*   Yes Historical Provider   diltiazem XR (DILACOR XR) 120 mg XR capsule Take  by mouth two (2) times a day. Yes Historical Provider   HYDROcodone-acetaminophen (NORCO)  mg tablet Take 1 Tab by mouth every twelve (12) hours as needed for Pain. Yes Historical Provider   metFORMIN (GLUCOPHAGE) 500 mg tablet Take 1,000 mg by mouth two (2) times daily (with meals). Yes Historical Provider   mupirocin (BACTROBAN) 2 % ointment Apply pea size amount inside edge of each nostril with Q tip. Squeeze nostrils together to spread medication. Use twice daily x5 days 5/22/17   Andres Burkitt, NP   warfarin (COUMADIN) 5 mg tablet Take 5 mg by mouth every Tuesday, Thursday, Saturday & Sunday. Historical Provider   WARFARIN SODIUM (COUMADIN PO) Take 6 mg by mouth every Monday, Wednesday, Friday. Historical Provider   aspirin delayed-release 81 mg tablet Take 81 mg by mouth daily. Pt states she frequently does not take this medicine    Historical Provider   coenzyme q10 10 mg cap Take 1 Cap by mouth daily. Historical Provider   b complex vitamins tablet Take 1 Tab by mouth daily. Historical Provider   atenolol (TENORMIN) 50 mg tablet Take 50 mg by mouth daily. Indications: VENTRICULAR RATE CONTROL IN ATRIAL FIBRILLATION    Historical Provider   fenofibrate micronized (LOFIBRA) 200 mg capsule Take  by mouth nightly.     Historical Provider   nitroglycerin (NITROSTAT) 0.4 mg SL tablet by SubLINGual route every five (5) minutes as needed for Chest Pain. Historical Provider         Current Facility-Administered Medications:     bupivacaine (PF) 0.25 % (ON-Q BAG) 400 ml, 6 mL/hr, Local Infiltration, CONTINUOUS, Salvatore Manrique MD, Last Rate: 6 mL/hr at 06/09/17 1248, 6 mL/hr at 06/09/17 1248    [START ON 6/10/2017] atenolol (TENORMIN) tablet 50 mg, 50 mg, Oral, DAILY, Milena Torres PA-C    [START ON 6/10/2017] atorvastatin (LIPITOR) tablet 40 mg, 40 mg, Oral, DAILY, Milena Torres PA-C    . PHARMACY TO SUBSTITUTE PER PROTOCOL, , , Per Protocol, Milena Torres PA-C    [START ON 6/10/2017] digoxin (LANOXIN) tablet 0.125 mg, 0.125 mg, Oral, DAILY, Milena Torres PA-C    dilTIAZem SR (CARDIZEM SR) capsule 120 mg, 120 mg, Oral, Q12H, RIVERA Steven PA-C    . PHARMACY TO SUBSTITUTE PER PROTOCOL, , , Per Protocol, Milena Torres PA-C    Fenofibrate Nanocrystallized tab 160 mg, 160 mg, Oral, QHS, Milena Torres PA-C    [START ON 6/10/2017] metFORMIN (GLUCOPHAGE) tablet 1,000 mg, 1,000 mg, Oral, BID WITH MEALS, Milena Torres PA-C    mupirocin (BACTROBAN) 2 % ointment, , Both Nostrils, BID, RIVERA Steven PA-C    [START ON 6/10/2017] warfarin (COUMADIN) tablet 5 mg, 5 mg, Oral, EVERY TUES,THUR,SAT,SUN, Milena Torres PA-C    warfarin (COUMADIN) tablet 6 mg, 6 mg, Oral, Q MON, WED & FRI, Milena Torres PA-C    glucose chewable tablet 16 g, 4 Tab, Oral, PRN, Milena Torres PA-C    dextrose (D50W) injection syrg 12.5-25 g, 12.5-25 g, IntraVENous, PRN, Milena Torres PA-C    glucagon (GLUCAGEN) injection 1 mg, 1 mg, IntraMUSCular, PRN, Milena Torres PA-C    sodium chloride (NS) flush 5-10 mL, 5-10 mL, IntraVENous, Q8H, Milena Torres PA-C    sodium chloride (NS) flush 5-10 mL, 5-10 mL, IntraVENous, PRN, Milena Torres PA-C    0.9% sodium chloride infusion, 125 mL/hr, IntraVENous, CONTINUOUS, Milena Torres PA-C    oxyCODONE ER (OxyCONTIN) tablet 10 mg, 10 mg, Oral, Q12H, Milena Torres PA-C    acetaminophen (TYLENOL) tablet 650 mg, 650 mg, Oral, Q6H PRN, Milena Torres PA-C    oxyCODONE IR (ROXICODONE) tablet 5 mg, 5 mg, Oral, Q3H PRN, Milena Torres PA-C    oxyCODONE IR (ROXICODONE) tablet 10 mg, 10 mg, Oral, Q3H PRN, Milena Torres PA-C    morphine injection 2 mg, 2 mg, IntraVENous, Q3H PRN, Milena Torres PA-C    vancomycin (VANCOCIN) 1,690 mg in 0.9% sodium chloride 500 mL IVPB, 20 mg/kg, IntraVENous, Q12H, Milena Torres PA-C    insulin lispro (HUMALOG) injection, , SubCUTAneous, ONCE, [employer] Steven    [START ON 6/10/2017] aspirin delayed-release tablet 81 mg, 81 mg, Oral, DAILY, Milena Torres PA-C      Review of Systems:  (bold if positive, if negative)    Gen:  Eyes:  ENT:  CVS:  Pulm:  GI:    :    MS:  Skin:  Psych:  Endo:    Hem:  Renal:    Neuro:            Objective:      VITALS:    Vital signs reviewed; most recent are:    Visit Vitals    /50 (BP 1 Location: Left arm, BP Patient Position: At rest)    Pulse 100    Temp 98.1 °F (36.7 °C)    Resp 16    Ht 5' 6\" (1.676 m)    Wt 84.5 kg (186 lb 4.6 oz)    SpO2 93%    BMI 30.07 kg/m2     SpO2 Readings from Last 6 Encounters:   06/09/17 93%   05/18/17 97%   08/27/16 96%   07/27/16 97%   02/25/16 98%    O2 Flow Rate (L/min): 3 l/min     Intake/Output Summary (Last 24 hours) at 06/09/17 1445  Last data filed at 06/09/17 1338   Gross per 24 hour   Intake              700 ml   Output                0 ml   Net              700 ml            Exam:     Physical Exam:    Gen:  Well-developed, well-nourished, in no acute distress  HEENT:  Pink conjunctivae, PERRL, hearing intact to voice, moist mucous membranes  Neck:  Supple, without masses, thyroid non-tender  Resp:  No accessory muscle use, clear breath sounds without wheezes rales or rhonchi  Card:  No murmurs, normal S1, S2 without thrills, bruits or peripheral edema  Abd:  Soft, non-tender, non-distended, normoactive bowel sounds are present, no palpable organomegaly and no detectable hernias  Lymph:  No cervical or inguinal adenopathy  Musc:  No cyanosis or clubbing  Skin:  No rashes or ulcers, skin turgor is good  Neuro:  Cranial nerves are grossly intact, no focal motor weakness, follows commands appropriately  Psych:  Good insight, oriented to person, place and time, alert       Labs:    No results for input(s): WBC, HGB, HCT, PLT, HGBEXT, HCTEXT, PLTEXT in the last 72 hours. No results for input(s): NA, K, CL, CO2, GLU, BUN, CREA, CA, MG, PHOS, ALB, TBIL, TBILI, SGOT, ALT in the last 72 hours. Lab Results   Component Value Date/Time    Glucose (POC) 138 06/09/2017 12:45 PM    Glucose (POC) 118 06/09/2017 08:21 AM     No results for input(s): PH, PCO2, PO2, HCO3, FIO2 in the last 72 hours. No results for input(s): INR in the last 72 hours.     No lab exists for component: INREXT    Telemetry reviewed:       Risk of deterioration: medium      Total time spent with patient: 48 895 [address] discussed with: Patient, Family, Nursing Staff and >50% of time spent in counseling and coordination of care    Discussed:  Care Plan       ___________________________________________________    Attending Physician: Nelly Butler MD

## 2017-06-09 NOTE — ANESTHESIA POSTPROCEDURE EVALUATION
Post-Anesthesia Evaluation and Assessment    Patient: Anne Marie Hassan MRN: [id number]  SSN: [id number]    YOB: 1950  Age: 69 y.o. Sex: female       Cardiovascular Function/Vital Signs  Visit Vitals    /68    Pulse 86    Temp 36.6 °C (97.8 °F)    Resp 19    Ht 5' 6\" (1.676 m)    Wt 84.5 kg (186 lb 4.6 oz)    SpO2 96%    BMI 30.07 kg/m2       Patient is status post general anesthesia for Procedure(s):  RIGHT TOTAL SHOULDER REPLACEMENT. Nausea/Vomiting: None    Postoperative hydration reviewed and adequate. Pain:  Pain Scale 1: Numeric (0 - 10) (06/09/17 1241)  Pain Intensity 1: 0 (06/09/17 1241)   Managed    Neurological Status:   Neuro (WDL): Exceptions to WDL (06/09/17 1241)  Neuro  LUE Motor Response: Purposeful (06/09/17 1241)  LLE Motor Response: Purposeful (06/09/17 1241)  RUE Motor Response: Floppy (Nerve block) (06/09/17 1241)  RLE Motor Response: Purposeful (06/09/17 1241)   At baseline    Mental Status and Level of Consciousness: Arousable    Pulmonary Status:   O2 Device: Nasal cannula (06/09/17 1225)   Adequate oxygenation and airway patent    Complications related to anesthesia: None    Post-anesthesia assessment completed.  No concerns    Signed By: Blanca Roy MD     June 9, 2017

## 2017-06-09 NOTE — PERIOP NOTES
TRANSFER - OUT REPORT:    Verbal report given to   RN (name) on [facility]  being transferred to  Room 412 (unit) for routine progression of care       Report consisted of patients Situation, Background, Assessment and   Recommendations(SBAR). Information from the following report(s) SBAR was reviewed with the receiving nurse. Lines:   Peripheral IV 06/09/17 Left Forearm (Active)   Site Assessment Clean, dry, & intact 6/9/2017  1:38 PM   Phlebitis Assessment 0 6/9/2017  1:38 PM   Infiltration Assessment 0 6/9/2017  1:38 PM   Dressing Status Clean, dry, & intact 6/9/2017 12:20 PM   Dressing Type Transparent 6/9/2017 12:20 PM   Hub Color/Line Status Pink; Infusing 6/9/2017 12:20 PM        Opportunity for questions and clarification was provided.       Patient transported with:   Bedside report given to RN  Questions answered VSS   Son at bedside visiting

## 2017-06-09 NOTE — IP AVS SNAPSHOT
Summary of Care Report The Summary of Care report has been created to help improve care coordination. Users with access to [employer] or [address] (Web-based application) may access additional patient information including the Discharge Summary. If you are not currently a [address] user and need more information, please call the number listed below in the [place] [address] section and ask to be connected with Medical Records. Facility Information Name Address Phone [address] [id number] [phone] Patient Information Patient Name Sex  Hayden Prakash ([id number]) Female 1950 Discharge Information Admitting Provider Service Area Unit Katie Gomez MD / [address] [place] [address]  [phone] Discharge Provider Discharge Date/Time Discharge Disposition Destination (none) 6/10/2017 (Pending) AHR (none) Patient Language Language ENGLISH [13] Hospital Problems as of 6/10/2017  Reviewed: 2016  1:54 PM by Tesfaye Jackson MD  
  
  
  
 Class Noted - Resolved Last Modified POA Active Problems Osteoarthritis  2017 - Present 2017 by Katie Gomez MD Unknown Entered by Katie Gomez MD  
  DM (diabetes mellitus) ([facility]  2017 - Present 2017 by Aryan Waller MD Unknown Entered by Aryan Waller MD  
  Retroperitoneal fibrosis  2017 - Present 2017 by Aryan Waller MD Unknown Entered by Aryan Waller MD  
  Chronic a-fib ([facility]  2017 - Present 2017 by Aryan Waller MD Unknown Entered by Aryan Waller MD  
  AAA (abdominal aortic aneurysm) ([facility]  2017 - Present 2017 by Aryan Waller MD Unknown   Entered by Aryan Waller MD  
  
Non-Hospital Problems as of 6/10/2017  Reviewed: 2016  1:54 PM by Donna Sanford MD  
  
  
  
 Class Noted - Resolved Last Modified Active Problems Osteoarthritis (arthritis due to wear and tear of joints)  8/26/2016 - Present 8/26/2016 by Ninoska Jones MD  
  Entered by Ninoska Jones MD  
  
You are allergic to the following Allergen Reactions Bee Sting (Sting, Bee) Anaphylaxis Carries an epi pen Ciprofloxacin Hives Rash Itching Flagyl (Metronidazole) Rash Nausea and Vomiting Pcn (Penicillins) Rash Pt states she can take keflex without problem Current Discharge Medication List  
  
START taking these medications Dose & Instructions Dispensing Information Comments  
 ondansetron 4 mg disintegrating tablet Commonly known as:  ZOFRAN ODT Dose:  4 mg Take 1 Tab by mouth every eight (8) hours as needed for Nausea. Quantity:  30 Tab Refills:  0  
   
 oxyCODONE IR 5 mg immediate release tablet Commonly known as:  Darl Bohr Dose:  5-10 mg Take 1-2 Tabs by mouth every three (3) hours as needed. Max Daily Amount: 80 mg.  
 Quantity:  90 Tab Refills:  0 CONTINUE these medications which have CHANGED Dose & Instructions Dispensing Information Comments HYDROcodone bitartrate 30 mg ER tablet Commonly known as:  HYSINGLA ER What changed:  how much to take Dose:  60 mg Take 2 Tabs by mouth daily. Max Daily Amount: 60 mg. *DO NOT CRUSH,CHEW, OR DISSOLVE TABLET* Quantity:  14 Tab Refills:  0 CONTINUE these medications which have NOT CHANGED Dose & Instructions Dispensing Information Comments  
 aspirin delayed-release 81 mg tablet Dose:  81 mg Take 81 mg by mouth daily. Pt states she frequently does not take this medicine Refills:  0  
   
 atenolol 50 mg tablet Commonly known as:  TENORMIN Dose:  50 mg Take 50 mg by mouth daily. Indications: VENTRICULAR RATE CONTROL IN ATRIAL FIBRILLATION Refills:  0  
   
 atorvastatin 40 mg tablet Commonly known as:  LIPITOR Dose:  40 mg Take 40 mg by mouth daily. Refills:  0  
   
 b complex vitamins tablet Dose:  1 Tab Take 1 Tab by mouth daily. Refills:  0  
   
 coenzyme q10 10 mg Cap Dose:  1 Cap Take 1 Cap by mouth daily. Refills:  0  
   
 * COUMADIN PO Dose:  6 mg Take 6 mg by mouth every Monday, Wednesday, Friday. Refills:  0  
   
 * warfarin 5 mg tablet Commonly known as:  COUMADIN Dose:  5 mg Take 5 mg by mouth every Tuesday, Thursday, Saturday & Sunday. Refills:  0 DIGITEK 0.125 mg tablet Generic drug:  digoxin Dose:  0.125 mg Take 0.125 mg by mouth daily. Refills:  0  
   
 dilTIAZem  mg XR capsule Commonly known as:  DILACOR XR Take  by mouth two (2) times a day. Refills:  0 EPINEPHrine 0.3 mg/0.3 mL injection Commonly known as:  Ionia Catena Dose:  0.3 mg  
0.3 mg by IntraMUSCular route once as needed. Refills:  0  
   
 fenofibrate micronized 200 mg capsule Commonly known as:  LOFIBRA Take  by mouth nightly. Refills:  0  
   
 metFORMIN 500 mg tablet Commonly known as:  GLUCOPHAGE Dose:  1000 mg Take 1,000 mg by mouth two (2) times daily (with meals). Refills:  0  
   
 mupirocin 2 % ointment Commonly known as:  [facility] Apply pea size amount inside edge of each nostril with Q tip. Squeeze nostrils together to spread medication. Use twice daily x5 days Quantity:  22 g Refills:  0  
   
 nitroglycerin 0.4 mg SL tablet Commonly known as:  NITROSTAT  
 by SubLINGual route every five (5) minutes as needed for Chest Pain. Refills:  0  
   
 * Notice: This list has 2 medication(s) that are the same as other medications prescribed for you. Read the directions carefully, and ask your doctor or other care provider to review them with you. STOP taking these medications Comments HYDROcodone-acetaminophen  mg tablet Commonly known as:  [facility]  
   
   
 oxyCODONE-acetaminophen  mg per tablet Commonly known as:  PERCOCET 10 PERCOCET  mg per tablet Generic drug:  oxyCODONE-acetaminophen Surgery Information ID Date/Time Status Primary Surgeon All Procedures Location [id number] 6/9/2017 0930 Unposted Michelle Pham MD RIGHT TOTAL SHOULDER REPLACEMENT [facility]    
 RIGHT TOTAL SHOULDER REPLACEMENT:  RIGHT TOTAL SHOULDER REPLACEMENT Follow-up Information Follow up With Details Comments Contact Info Rand Gonzalez MD   Patient can only remember the practice name and not the physician Discharge Instructions Hard copy in chart Chart Review Routing History Recipient Method Report Sent By Juliann Garcia MD  
Fax: [phone] Phone: [phone]  Fax [facility] RESULTS ROUTING TRACKING - CONSOLIDATE CLIN VIEW Miguelangel Zuleta [[id number]] 3/16/2015  2:54 PM

## 2017-06-10 VITALS
HEART RATE: 70 BPM | OXYGEN SATURATION: 95 % | HEIGHT: 66 IN | TEMPERATURE: 98.6 F | SYSTOLIC BLOOD PRESSURE: 110 MMHG | RESPIRATION RATE: 18 BRPM | BODY MASS INDEX: 29.94 KG/M2 | WEIGHT: 186.29 LBS | DIASTOLIC BLOOD PRESSURE: 66 MMHG

## 2017-06-10 LAB
EST. AVERAGE GLUCOSE BLD GHB EST-MCNC: 186 MG/DL
GLUCOSE BLD STRIP.AUTO-MCNC: 126 MG/DL (ref 65–100)
GLUCOSE BLD STRIP.AUTO-MCNC: 174 MG/DL (ref 65–100)
HBA1C MFR BLD: 8.1 % (ref 4.2–6.3)
HGB BLD-MCNC: 12.1 G/DL (ref 11.5–16)
SERVICE CMNT-IMP: ABNORMAL
SERVICE CMNT-IMP: ABNORMAL

## 2017-06-10 PROCEDURE — 77030027138 HC INCENT SPIROMETER -A

## 2017-06-10 PROCEDURE — 74011636637 HC RX REV CODE- 636/637: Performed by: INTERNAL MEDICINE

## 2017-06-10 PROCEDURE — 97116 GAIT TRAINING THERAPY: CPT

## 2017-06-10 PROCEDURE — 36415 COLL VENOUS BLD VENIPUNCTURE: CPT | Performed by: PHYSICIAN ASSISTANT

## 2017-06-10 PROCEDURE — 82962 GLUCOSE BLOOD TEST: CPT

## 2017-06-10 PROCEDURE — 97161 PT EVAL LOW COMPLEX 20 MIN: CPT

## 2017-06-10 PROCEDURE — 74011250637 HC RX REV CODE- 250/637: Performed by: PHYSICIAN ASSISTANT

## 2017-06-10 PROCEDURE — 97165 OT EVAL LOW COMPLEX 30 MIN: CPT

## 2017-06-10 PROCEDURE — 97110 THERAPEUTIC EXERCISES: CPT

## 2017-06-10 PROCEDURE — 74011250636 HC RX REV CODE- 250/636: Performed by: PHYSICIAN ASSISTANT

## 2017-06-10 PROCEDURE — 85018 HEMOGLOBIN: CPT | Performed by: PHYSICIAN ASSISTANT

## 2017-06-10 PROCEDURE — 83036 HEMOGLOBIN GLYCOSYLATED A1C: CPT | Performed by: PHYSICIAN ASSISTANT

## 2017-06-10 PROCEDURE — 97535 SELF CARE MNGMENT TRAINING: CPT

## 2017-06-10 RX ORDER — OXYCODONE HCL 10 MG/1
10 TABLET, FILM COATED, EXTENDED RELEASE ORAL ONCE
Status: COMPLETED | OUTPATIENT
Start: 2017-06-10 | End: 2017-06-10

## 2017-06-10 RX ORDER — ONDANSETRON 4 MG/1
4 TABLET, ORALLY DISINTEGRATING ORAL
Qty: 30 TAB | Refills: 0 | Status: SHIPPED | OUTPATIENT
Start: 2017-06-10 | End: 2021-04-27 | Stop reason: DRUGHIGH

## 2017-06-10 RX ORDER — OXYCODONE HYDROCHLORIDE 5 MG/1
5-10 TABLET ORAL
Qty: 90 TAB | Refills: 0 | Status: SHIPPED | OUTPATIENT
Start: 2017-06-10 | End: 2021-02-25 | Stop reason: DRUGHIGH

## 2017-06-10 RX ORDER — OXYCODONE HCL 20 MG/1
20 TABLET, FILM COATED, EXTENDED RELEASE ORAL EVERY 12 HOURS
Status: DISCONTINUED | OUTPATIENT
Start: 2017-06-10 | End: 2017-06-10 | Stop reason: HOSPADM

## 2017-06-10 RX ORDER — HYDROCODONE BITARTRATE 30 MG/1
60 TABLET, EXTENDED RELEASE ORAL DAILY
Qty: 14 TAB | Refills: 0 | Status: SHIPPED | OUTPATIENT
Start: 2017-06-10 | End: 2021-02-25 | Stop reason: DRUGHIGH

## 2017-06-10 RX ADMIN — Medication 10 ML: at 05:27

## 2017-06-10 RX ADMIN — Medication 2 MG: at 04:10

## 2017-06-10 RX ADMIN — OXYCODONE HYDROCHLORIDE 10 MG: 10 TABLET, FILM COATED, EXTENDED RELEASE ORAL at 10:31

## 2017-06-10 RX ADMIN — ATENOLOL 50 MG: 50 TABLET ORAL at 09:15

## 2017-06-10 RX ADMIN — METFORMIN HYDROCHLORIDE 1000 MG: 500 TABLET ORAL at 09:15

## 2017-06-10 RX ADMIN — OXYCODONE HYDROCHLORIDE 10 MG: 5 TABLET ORAL at 08:12

## 2017-06-10 RX ADMIN — VITAMIN C 1 TABLET: TAB at 09:15

## 2017-06-10 RX ADMIN — ASPIRIN 81 MG: 81 TABLET, COATED ORAL at 09:15

## 2017-06-10 RX ADMIN — OXYCODONE HYDROCHLORIDE 10 MG: 10 TABLET, FILM COATED, EXTENDED RELEASE ORAL at 09:15

## 2017-06-10 RX ADMIN — OXYCODONE HYDROCHLORIDE 10 MG: 5 TABLET ORAL at 05:27

## 2017-06-10 RX ADMIN — DILTIAZEM HYDROCHLORIDE 120 MG: 60 CAPSULE, EXTENDED RELEASE ORAL at 04:10

## 2017-06-10 RX ADMIN — INSULIN LISPRO 2 UNITS: 100 INJECTION, SOLUTION INTRAVENOUS; SUBCUTANEOUS at 12:13

## 2017-06-10 RX ADMIN — MUPIROCIN: 20 OINTMENT TOPICAL at 09:25

## 2017-06-10 RX ADMIN — ACETAMINOPHEN 650 MG: 325 TABLET ORAL at 08:12

## 2017-06-10 RX ADMIN — Medication 2 MG: at 09:11

## 2017-06-10 RX ADMIN — OXYCODONE HYDROCHLORIDE 10 MG: 5 TABLET ORAL at 01:00

## 2017-06-10 RX ADMIN — DIGOXIN 0.12 MG: 0.12 TABLET ORAL at 09:15

## 2017-06-10 RX ADMIN — VANCOMYCIN HYDROCHLORIDE 1500 MG: 10 INJECTION, POWDER, LYOPHILIZED, FOR SOLUTION INTRAVENOUS at 04:10

## 2017-06-10 RX ADMIN — OXYCODONE HYDROCHLORIDE 5 MG: 5 TABLET ORAL at 12:13

## 2017-06-10 NOTE — PROGRESS NOTES
Name:  Shaheen Ward  Age:  69 y.o. MRN:  [id number]  CSN:  [id number]  :  1950      6/10/2017  11:14 AM    Anesthesia Peripheral Nerve Catheter Post-Op Rounds Note  Daily Management of Continuous Peripheral Nerve/Plexus Catheter    Referring physician: Elodia Walters MD   Patient status post Procedure(s) with comments:  RIGHT TOTAL SHOULDER REPLACEMENT - RIGHT TOTAL SHOULDER REPLACEMENT on 2017    POST OP Day # 1    Visit Vitals    /79    Pulse (!) 108    Temp 36.5 °C (97.7 °F)    Resp 20    Ht 5' 6\" (1.676 m)    Wt 84.5 kg (186 lb 4.6 oz)    SpO2 93%    BMI 30.07 kg/m2       Patient rates pain 6 at rest and 9 with movement. Pain is subjectively rated by patient as severe. Pain location: Shoulder    Peripheral Nerve Catheter ball functioning, site is clean, dry, and intact. No complications. Patient with significant amount of pain in her shoulder. Her peripheral nerve catheter was bolused with 15 cc of 0.5% ropivicaine. After about 15 minutes the patient states that her pain has improved some, but still has some pain. I think she will continue to need additional pain medicine along with her peripheral nerve block. It is most likely providing some pain relief but is not adequate for total analgesia.        Germania Cole MD

## 2017-06-10 NOTE — DISCHARGE SUMMARY
Discharge Summary    Physician: Юлия Isidro M.D. Physician Assistant: Annabel Melendez PA-C    Final Diagnosis:   1. 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 History of Present Illness: The patient has a long-standing history of pain within the right shoulder . The patient has severe degenerative arthritis of the right shoulder and has exhausted conservative therapy at this time. The patient presents for the above-mentioned procedure. The patient has been cleared from a medical and cardiac standpoint. Past Medical History:  Recorded in the chart. Physical Examination: Also recorded in the chart. Examination of the right shoulder reveals a clean, dry and intact dressing. The anterior and posterior compartments of the right arm are soft and supple. Sensation to light touch over C5-T1 grossly intact. 2+ radial pulse Sling with abduction pillow and onQ pump in place. Post-operative X-rays reveal the prosthesis in good position without evidence of fractures or dislocations. Course in the Hospital:  The patient was admitted to the hospital.  The Pt. Underwent a right total shoulder arthroplasty. Postoperatively, the pt complained of increased level of pain. She previously has been under the care of pain management, while taking 30mg Hysingla and oxycodone 10mg. The pain medicine was increased from oxycodone ER 10 mg BID to oxycodone 20mg BID. Anesthesia checked and bolused her onQ pump. Her pain improved afterwards. The pt. Remained stable from a medical standpoint. The patient ambulated, full weightbearing within the hospital with Physical therapy. Physical therapy and occupational therapy consulted the patient. The patient has previously been taking Coumadin and ASA 81mg as prescribed by her primary care physician.  Her home regimen of Coumadin and ASA 81 was started again on POD #1 per her primary care physician's recommendation. At the time of discharge, there was no evidence of any DVT noted. The patient had negative Homans signs bilateral lower extremities. At the time of discharge, the patient's right shoulder incision appeared with sutures intact. No signs of infection or inflammation noted. The patient will follow up in our office in 2-1/2 weeks. Dispo: The patient was discharged to home with outpatient physical therapy scheduled to start next week. She will have a regular diet. DVT prophylaxis is Coumadin and ASA 81mg per her primary care physician.        RIVERA Steven PA-C

## 2017-06-10 NOTE — PROGRESS NOTES
Occupational Therapy EVALUATION/discharge  Patient: Sam Gillis (73 y.o. female)  Date: 6/10/2017  Primary Diagnosis: RIGHT SHOULDER OA  Osteoarthritis  Procedure(s) (LRB):  RIGHT TOTAL SHOULDER REPLACEMENT (Right) 1 Day Post-Op   Precautions:   NWB (NWB RUE s/p RTSA On Q immobilized in sling)    ASSESSMENT:   Based on the objective data described below, the patient presents with NWB RUE s/p RTSA On Q immobilized in sling POD #1. Patient presents with significant pain in RUE despite On Q, is resistant to any elbow ROM  And limited wrist and hand mobility. Patient has PMH of LTSA 2016, indicates understanding of precautions and exercise protocol. Educated to application and management of sling and cold care. Issued written instructions of E/W/H exercises and pendulum exercises. Patient to be followed by OP. Patient has assist as needed at home. Further skilled acute occupational therapy is not indicated at this time. Discharge Recommendations: Outpatient  Further Equipment Recommendations for Discharge: none      SUBJECTIVE:   Patient stated I have been through this before.     OBJECTIVE DATA SUMMARY:   HISTORY:   Past Medical History:   Diagnosis Date    A-fib ([address]     Aneurysm ([address]      hx decending aortic aneurysm  states \"not there now\"  present on 2013 duplex imaging\"     Arrhythmia     \"fast heart beat\"    Arthritis     Autoimmune disease ([address]     retroperitoneal fibrosis-  seen Ewelina Pedraza for Rx    Burning with urination     Chronic pain     sees pain management MD for chronic back pain Dr Diana Duong [facility])     Fatigue     Hypercholesterolemia     Ill-defined condition     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    Incontinence in female     Muscle weakness     Retroperitoneal fibrosis     aorta    Stroke ([address] 01/25/2016    only deficit is loss of right peripheral visit    Tinnitus      Past Surgical History:   Procedure Laterality Date    HX ADENOIDECTOMY      childhood  HX GYN  2010    tubes & ovaries removed    HX HEART CATHETERIZATION      X2- no blockages found    HX SHOULDER REPLACEMENT Left 2016    HX TONSILLECTOMY      childhood       Prior Level of Function/Home Situation: I with ADLS, low activity lifestyle  Expanded or extensive additional review of patient history:     Home Situation  Home Environment: Private residence  # Steps to Enter: 6  Rails to Enter: Yes  Hand Rails : Bilateral  One/Two Story Residence: One story  Living Alone: No  Support Systems: Child(sara), Spouse/Significant Other/Partner  Patient Expects to be Discharged to[de-identified] Private residence  Current DME Used/Available at Home: [address], [place], [facility], bedside, Pamela Pepper, straight, [address], rollator, [address] [facility] [employer] chair  Tub or Shower Type: Shower  [x]  Right hand dominant   []  Left hand dominant    EXAMINATION OF PERFORMANCE DEFICITS:  Cognitive/Behavioral Status:  Neurologic State: Alert  Orientation Level: Appropriate for age;Oriented X4  Cognition: Appropriate decision making; Appropriate for age attention/concentration; Appropriate safety awareness  Perception: Appears intact  Perseveration: No perseveration noted  Safety/Judgement: Insight into deficits    Skin: appears intact UE  Edema: none noted UE    Vision/Perceptual:      WDL                 Diplopia: No    Acuity: Within Defined Limits;Able to read clock/calendar on wall without difficulty    Corrective Lenses: Glasses    Range of Motion:    AROM: Within functional limits (LUE, NWB RUE s/p RTSA On Q immobilized in sling)                         Strength:    Strength: Within functional limits (LUE, NWB RUE s/p RTSA On Q immobilized in sling)                Coordination:  Coordination: Within functional limits (LUE, NWB RUE s/p RTSA On Q immobilized in sling)  Fine Motor Skills-Upper: Left Intact; Right Impaired (NWB RUE s/p RTSA On Q immobilized in sling)    Gross Motor Skills-Upper: Left Intact; Right Impaired (NWB RUE s/p RTSA On Q immobilized in sling)    Tone & Sensation:    Tone: Normal (LUE, NWB RUE s/p RTSA On Q immobilized in sling)  Sensation: Intact (LUE, RUE On Q)                      Balance:  Sitting: Intact; With support  Standing: Intact; With support    Functional Mobility and Transfers for ADLs:  Bed Mobility:  Rolling: Stand-by asssistance  Supine to Sit: Stand-by asssistance  Scooting: Stand-by asssistance    Transfers:  Sit to Stand: Supervision  Stand to Sit: Supervision  Bed to Chair: Supervision  Toilet Transfer : Supervision    ADL Assessment:  Feeding: Independent    Oral Facial Hygiene/Grooming: Independent (in standing at sink)    Bathing: Moderate assistance (NWB RUE s/p RTSA On Q immobilized in sling)    Upper Body Dressing: Moderate assistance (NWB RUE s/p RTSA On Q immobilized in sling)    Lower Body Dressing: Minimum assistance    Toileting: Modified independent (use of grab bar on left)                ADL Intervention and task modifications:     Patient instructed to don LIAM extremity  first, doff last. Patient instructed to don all clothing while sitting prior to standing, doff all clothing to knees while standing, then sit to doff clothing off from knees to feet in order to facilitate fall prevention, pain management, and energy conservation with ADLS. Patient indicated understanding/recalled strategies with min instruction.                                 Cognitive Retraining  Safety/Judgement: Insight into deficits    Therapeutic Exercise:  E/W/H exercises  Functional Measure:  Barthel Index:    Bathin  Bladder: 10  Bowels: 10  Groomin  Dressin  Feeding: 10  Mobility: 10  Stairs: 10  Toilet Use: 10  Transfer (Bed to Chair and Back): 15  Total: 85       Barthel and G-code impairment scale:  Percentage of impairment CH  0% CI  1-19% CJ  20-39% CK  40-59% CL  60-79% CM  80-99% CN  100%   Barthel Score 0-100 100 99-80 79-60 59-40 20-39 1-19   0   Barthel Score 0-20 20 17-19 13-16 9-12 5-8 1-4 0      The Barthel ADL Index: Guidelines  1. The index should be used as a record of what a patient does, not as a record of what a patient could do. 2. The main aim is to establish degree of independence from any help, physical or verbal, however minor and for whatever reason. 3. The need for supervision renders the patient not independent. 4. A patient's performance should be established using the best available evidence. Asking the patient, friends/relatives and nurses are the usual sources, but direct observation and common sense are also important. However direct testing is not needed. 5. Usually the patient's performance over the preceding 24-48 hours is important, but occasionally longer periods will be relevant. 6. Middle categories imply that the patient supplies over 50 per cent of the effort. 7. Use of aids to be independent is allowed. Justine Leach., Barthel, D.W. (6014). Functional evaluation: the Barthel Index. [address] [facility] (14)2. RAJAN Hurley, Kathrin Riverautant., Rita Gorman., Se, [address] [place] [address] (1999). Measuring the change indisability after inpatient rehabilitation; comparison of the responsiveness of the Barthel Index and Functional Lula Measure. Journal of Neurology, Neurosurgery, and Psychiatry, 66(4), 501-727. Daniel Mcrae, N.J.A, CK Cabrera.MINDY, & Bebeto Fitch, M.A. (2004.) Assessment of post-stroke quality of life in cost-effectiveness studies: The usefulness of the Barthel Index and the EuroQoL-5D. Quality of Life Research, 13, 637-88       G codes: In compliance with CMSs Claims Based Outcome Reporting, the following G-code set was chosen for this patient based on their primary functional limitation being treated: The outcome measure chosen to determine the severity of the functional limitation was the Barthel with a score of 85/100 which was correlated with the impairment scale. ?  Other PT/OT Primary Functional Limitations:     - CURRENT STATUS: CI - 1%-19% impaired, limited or restricted    - GOAL STATUS: CI - 1%-19% impaired, limited or restricted    - D/C STATUS:  CI - 1%-19% impaired, limited or restricted     Occupational Therapy Evaluation Charge Determination   History Examination Decision-Making   MEDIUM Complexity : Expanded review of history including physical, cognitive and psychosocial  history  MEDIUM Complexity : 3-5 performance deficits relating to physical, cognitive , or psychosocial skils that result in activity limitations and / or participation restrictions LOW Complexity : No comorbidities that affect functional and no verbal or physical assistance needed to complete eval tasks       Based on the above components, the patient evaluation is determined to be of the following complexity level: MEDIUM  Pain:  Pain Scale 1: Numeric (0 - 10)  Pain Intensity 1: 5  Pain Location 1: Shoulder  Pain Orientation 1: Right  Pain Description 1: Aching  Pain Intervention(s) 1: Medication (see MAR)  Activity Tolerance:   Patient completed multiple sit to stand transfers, mobility around room and bathroom   in completion of ADLS with no LOB or break  Please refer to the flowsheet for vital signs taken during this treatment. After treatment:   [x]  Patient left in no apparent distress sitting up in chair  []  Patient left in no apparent distress in bed  [x]  Call bell left within reach  [x]  Nursing notified  []  Caregiver present  []  Bed alarm activated    COMMUNICATION/EDUCATION:   Communication/Collaboration:  [x]      Home safety education was provided and the patient/caregiver indicated understanding. [x]      Patient/family have participated as able and agree with findings and recommendations. []      Patient is unable to participate in plan of care at this time.   Findings and recommendations were discussed with: Physical Therapist, Registered Nurse and     Romero Russo OT  Time Calculation: 55 mins

## 2017-06-10 NOTE — PROGRESS NOTES
physical Therapy EVALUATION/DISCHARGE  Patient: Sam Gillis (67 y.o. female)  Date: 6/10/2017  Primary Diagnosis: RIGHT SHOULDER OA  Osteoarthritis  Procedure(s) (LRB):  RIGHT TOTAL SHOULDER REPLACEMENT (Right) 1 Day Post-Op   Precautions: Fall  NWB (NWB RUE s/p RTSA On Q immobilized in sling)  ASSESSMENT :  Based on the objective data described below, the patient presents with generalized deconditioning and high levels of pain in R shoulder secondary to R TSA 06/09/2017. Patient received at [facility] following OT session. Patient reporting high levels of pain and tearful, initially declining PT services, but with encouragement was willing. Patient able to complete all functional mobility with CGA- Supervision assistance. Ambulated 75 feet with quad cane and CGA/SBA and completed stair training on 6 stairs with railing on L hand side. Patient reported that she ascend/descends stairs differently due to cyst located on back of R knee. Patient returned to EOB with son present in room. VSS throughout session, SpO2 remained >90% throughout. PTA, patient has history of AD use with ambulation and lives with son/spouse. She required assistance for ADLs prior to surgery. Patient lives in [place] with 6 steps to enter, B hand rails available. PT recommends patient be [address] home with OPPT services to improve overall function and R UE use. Skilled physical therapy is not indicated at this time. PLAN :  Discharge Recommendations: Outpatient  Further Equipment Recommendations for Discharge: None     SUBJECTIVE:   Patient stated I just can't do it right now.  (tearful at the time)    OBJECTIVE DATA SUMMARY:   HISTORY:    Past Medical History:   Diagnosis Date    A-fib ([address]     Aneurysm ([address]      hx decending aortic aneurysm  states \"not there now\"  present on 2013 duplex imaging\"     Arrhythmia     \"fast heart beat\"    Arthritis     Autoimmune disease ([address]     retroperitoneal fibrosis-  seen Ewelina Starjeff for Rx    Burning with urination     Chronic pain     sees pain management MD for chronic back pain Dr Funmi [facility])     Fatigue     Hypercholesterolemia     Ill-defined condition     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 Living in single story home with son and spouse.   Personal factors and/or comorbidities impacting plan of care:     Home Situation  Home Environment: Private residence  # Steps to Enter: 6  Rails to Enter: Yes  Hand Rails : Bilateral  One/Two Story Residence: One story  Living Alone: No  Support Systems: Child(sara), Spouse/Significant Other/Partner  Patient Expects to be Discharged to[de-identified] Private residence  Current DME Used/Available at Home: Rick Apgar, rolling, Commode, bedside, Cata Borne, straight, Rick Apgar, rollator, Shower chair  Tub or Shower Type: Shower    EXAMINATION/PRESENTATION/DECISION MAKING:   Critical Behavior:  Neurologic State: Alert  Orientation Level: Appropriate for age, Oriented X4  Cognition: Appropriate decision making, Appropriate for age attention/concentration, Appropriate safety awareness  Safety/Judgement: Insight into deficits  Hearing:     Skin:  All exposed areas intact   Edema: None noted   Range Of Motion:  AROM: Within functional limits (LUE, NWB RUE s/p RTSA On Q immobilized in sling)  RLE Assessment (WDL): Within defined limits              LLE Assessment (WDL): Within defined limits     Strength:    Strength: Within functional limits (LUE, NWB RUE s/p RTSA On Q immobilized in sling)     RLE Assessment (WDL): Within defined limits        LLE Assessment (WDL): Within defined limits     Tone & Sensation:   Tone: Normal (LUE, NWB RUE s/p RTSA On Q immobilized in sling)              Sensation: Intact (LUE, RUE On Q)  RLE Assessment (WDL): Within defined limits     LLE Assessment (WDL): Within defined limits      Coordination:  Coordination: Within functional limits (LUE, NWB RUE s/p RTSA On Q immobilized in sling)  Vision:   Diplopia: No  Acuity: Within Defined Limits;Able to read clock/calendar on wall without difficulty  Corrective Lenses: Glasses  Functional Mobility:  Bed Mobility:  Rolling: Stand-by asssistance  Supine to Sit: Stand-by asssistance     Scooting: Stand-by asssistance  Transfers:  Sit to Stand: Supervision  Stand to Sit: Supervision        Bed to Chair: Supervision              Balance:   Sitting: Intact; With support  Standing: Intact; With support  Ambulation/Gait Training:  Distance (ft): 75 Feet (ft)  Assistive Device: Gait belt;Cane, quad  Ambulation - Level of Assistance: Contact guard assistance        Gait Abnormalities: Decreased step clearance        Base of Support: Widened  Stance: Right decreased; Left decreased  Speed/Aggie: Pace decreased (<100 feet/min)  Step Length: Left shortened;Right shortened                      Stairs:  Number of Stairs Trained: 6  Stairs - Level of Assistance: Contact guard assistance   Rail Use: Left        Functional Measure:  Barthel Index:    Bathin  Bladder: 10  Bowels: 10  Groomin  Dressin  Feeding: 10  Mobility: 10  Stairs: 10  Toilet Use: 10  Transfer (Bed to Chair and Back): 15  Total: 85       Barthel and G-code impairment scale:  Percentage of impairment CH  0% CI  1-19% CJ  20-39% CK  40-59% CL  60-79% CM  80-99% CN  100%   Barthel Score 0-100 100 99-80 79-60 59-40 20-39 1-19   0   Barthel Score 0-20 20 17-19 13-16 9-12 5-8 1-4 0      The Barthel ADL Index: Guidelines  1. The index should be used as a record of what a patient does, not as a record of what a patient could do. 2. The main aim is to establish degree of independence from any help, physical or verbal, however minor and for whatever reason. 3. The need for supervision renders the patient not independent. 4. A patient's performance should be established using the best available evidence. Asking the patient, friends/relatives and nurses are the usual sources, but direct observation and common sense are also important. However direct testing is not needed. 5. Usually the patient's performance over the preceding 24-48 hours is important, but occasionally longer periods will be relevant. 6. Middle categories imply that the patient supplies over 50 per cent of the effort. 7. Use of aids to be independent is allowed. Neil Farrar., Barthel, D.W. (3831). Functional evaluation: the Barthel Index. [address] [facility] (14)2. RAJAN Roque, Joetta Kayser., Janine Thomas., [place], [address] [place] [address] (1999). Measuring the change indisability after inpatient rehabilitation; comparison of the responsiveness of the Barthel Index and Functional [place] Measure. Journal of Neurology, Neurosurgery, and Psychiatry, 66(4), 001-519. Jaswinder Valdez, N.J.A, JJ Cabrera, & Zoila Ewing [facility]. (2004.) Assessment of post-stroke quality of life in cost-effectiveness studies: The usefulness of the Barthel Index and the EuroQoL-5D. Quality of Life Research, 13, 587-73         G codes: In compliance with CMSs Claims Based Outcome Reporting, the following G-code set was chosen for this patient based on their primary functional limitation being treated: The outcome measure chosen to determine the severity of the functional limitation was the CI with a score of 85/100 which was correlated with the impairment scale.     ? Mobility - Walking and Moving Around:     - CURRENT STATUS: CI - 1%-19% impaired, limited or restricted    - GOAL STATUS: CI - 1%-19% impaired, limited or restricted    - D/C STATUS:  CI - 1%-19% impaired, limited or restricted        Physical Therapy Evaluation Charge Determination   History Examination Presentation Decision-Making   LOW Complexity : Zero comorbidities / personal factors that will impact the outcome / POC LOW Complexity : 1-2 Standardized tests and measures addressing body structure, function, activity limitation and / or participation in recreation  LOW Complexity : Stable, uncomplicated  Other outcome measures Barthel Index  LOW       Based on the above components, the patient evaluation is determined to be of the following complexity level: LOW     Pain:  Pain Scale 1: Numeric (0 - 10)  Pain Intensity 1: 5  Pain Location 1: Shoulder  Activity Tolerance:   VSS throughout session   Please refer to the flowsheet for vital signs taken during this treatment. After treatment:   []   Patient left in no apparent distress sitting up in chair  [x]   Patient left in no apparent distress in bed  [x]   Call bell left within reach  [x]   Nursing notified  [x]   Caregiver present  []   Bed alarm activated    COMMUNICATION/EDUCATION:   Communication/Collaboration:  [x]   Fall prevention education was provided and the patient/caregiver indicated understanding. [x]   Patient/family have participated as able and agree with findings and recommendations. [x]   Patient is unable to participate in plan of care at this time.   Findings and recommendations were discussed with: Occupational Therapist and Registered Nurse    Thank you for this referral.  Raiza Thorne, PT, DPT    Time Calculation: 15 mins

## 2017-06-10 NOTE — PROGRESS NOTES
Bedside and Verbal shift change report given to Zackery Victoria RN (oncoming nurse) by Graeme Silverio RN (offgoing nurse). Report included the following information SBAR, Kardex, OR Summary, Procedure Summary, Intake/Output, MAR and Recent Results.

## 2017-06-10 NOTE — PROGRESS NOTES
Orthopaedic Progress Note  Post Op day: 1 Day Post-Op    Ivonne 10, 2017 10:14 AM     Patient: Dion Velez MRN: [id number]  SSN: [id number]    YOB: 1950  Age: 69 y.o. Sex: female      Admit date:  2017  Date of Surgery:  2017   Procedures:  Procedure(s):  RIGHT TOTAL SHOULDER REPLACEMENT  Admitting Physician:  Freda Renae MD   Surgeon:  Morro Ridley) and Role:     * Freda Renae MD - Primary    Consulting Physician(s): Treatment Team: Attending Provider: Bubba Lewis MD; Care Manager: Selina Montemayor; Utilization Review: Mitch Funk RN    SUBJECTIVE:     Dion Velez is a 69 y.o. female is 1 Day Post-Op s/p Procedure(s):  RIGHT TOTAL SHOULDER REPLACEMENT with an increased level of post-operative pain. No complaints of nausea, vomiting, dizziness, lightheadedness, chest pain, or shortness of breath. OBJECTIVE:       Physical Exam:  General: Alert, cooperative, no distress. Respiratory: Respirations unlabored  Neurological:  Neurovascular exam within normal limits. Motor: + DF/PF. Musculoskeletal: Calves soft, supple, non-tender upon palpation. Dressing/Wound:  Clean, dry and intact. No significant erythema or swelling.   OnQ pump intact    Vital Signs:      Patient Vitals for the past 8 hrs:   BP Temp Pulse Resp SpO2   06/10/17 0915 138/79 - (!) 108 20 93 %   06/10/17 0442 (!) 153/94 97.7 °F (36.5 °C) 91 22 94 %                                          Temp (24hrs), Av °F (36.7 °C), Min:97.7 °F (36.5 °C), Max:98.3 °F (36.8 °C)      Labs:        Recent Labs      06/10/17   0635   HGB  12.1     Lab Results   Component Value Date/Time    Sodium 140 2017 02:49 PM    Potassium 4.9 2017 02:49 PM    Chloride 103 2017 02:49 PM    CO2 27 2017 02:49 PM    Glucose 87 2017 02:49 PM    BUN 15 2017 02:49 PM    Creatinine 0.75 2017 02:49 PM    Calcium 9.3 2017 02:49 PM       PT/OT:                Patient mobility ASSESSMENT / PLAN:   Active Problems:    Osteoarthritis (6/9/2017)      DM (diabetes mellitus) ([facility] (6/9/2017)      Retroperitoneal fibrosis (6/9/2017)      Chronic a-fib ([facility] (6/9/2017)      AAA (abdominal aortic aneurysm) ([facility] (6/9/2017)                    Pain Control: Increased oxycontin 10 to oxycontin 20 BID starting now   Anesthesia notified and will rebolus patient onQ pump   DVT Prophylaxis: Coumadin   Hemodynamics: stable   Activity: Out of bed    Disposition: Home w/ Family this evening if pain better managed     Spoke with Yemi Ma, pharmacist, who advised equivalence of oxycontin 20mg BID to be Hysingla 60mg once daily to be prescribed at discharge. Plan to evaluate and follow-up reducing back to original dosage. Signed By:  Ethan Chapa  [facility].  [phone]

## 2017-06-10 NOTE — OP NOTES
Leroy Lemus [facility]   [address] [facility], [address]   OP NOTE       Name:  Bety Hopkins   MR#:  [id number]   :  1950   Account #:  [de-identified]    Surgery Date:  2017   Date of Adm:  2017       PREOPERATIVE DIAGNOSIS: Right shoulder glenohumeral   osteoarthritis. POSTOPERATIVE DIAGNOSIS: Right shoulder glenohumeral   osteoarthritis. PROCEDURES PERFORMED: Right total shoulder replacement. ESTIMATED BLOOD LOSS: 75 mL. SPECIMENS REMOVED: None. ANESTHESIA: General plus regional block. SURGEON: Aman Harrison MD    ASSISTANTCB Ayala    COMPLICATIONS: None. DRAINS: None. DESCRIPTION: The patient was brought to the OR, given general   anesthesia, placed in a beach chair position. Head and neck were   appropriately aligned and secured. SCD hose were utilized. The right   upper extremity was prepped and draped in a sterile fashion with   ChloraPrep. An [place] drape was utilized. A deltopectoral incision and   approach was utilized. The cephalic vein was retracted laterally with   the deltoid. Superior 3-4 mm of the pectoralis major was released for   visualization. The biceps tendon was not identified in the groove. Subscapularis was released with the peel technique. The tendon was   tagged with #2 FiberWire sutures. The humeral head was brought into   the wound with advanced degenerative changes with absence of   articular cartilage and large spurs. Humeral head resection was   performed anatomically, while protecting the rotator cuff. A reamer was   inserted, followed by broaching up to an Arthrex size 6. The head   protector was applied. Next, the glenoid was addressed with retractors. A capsular release   was performed with an elevator. The Arthrex SCT planned guide was   utilized for placement of the central pin in the glenoid with preferential   reaming anteriorly based on the CT scan plan.  Peripheral osteophytes were removed from the glenoid. Central reamer was inserted over the   pin. Next, the guide was inserted for creation of the lug holes and keel. The   trial size medium glenoid was inserted. There was excellent position   and fixation. The trial was removed. The joint was irrigated and   thoroughly dried over the glenoid. Antibiotic-impregnated cement was   mixed, inserted into the superior lug hole and into the keel. These were   then pressurized. A bone slurry was placed around the central   compression peg of the glenoid component. The component was   inserted and secured. Next, the humeral head was addressed. A trial broach and a size 48 x   19 humeral head was inserted. It was good in position and satisfactory   tension. The trial was removed. The joint again was irrigated with   PulsaVac lavage. The short stem apex humeral head component 6   mm in size with a 48 x 19 head was inserted to appropriate   retroversion. Prior to inserting this, the sutures were passed for the   subscapularis repair, and 2 drill holes were created in the bicipital   groove. Once there was secure fixation, the subscapularis was   reattached using the Arthrex technique, resulting in secure fixation of   the subscapularis. The interval was closed with a single #2 FiberWire   suture. There was good range of motion and stability. The wound   again was irrigated with PulsaVac lavage. Two grams of vancomycin   powder was placed in the wound. Subcutaneous 2-0 Vicryl and running   3-0 Monocryl subcuticular suture and sterile dressing were applied. The patient was awakened and returned to Recovery in stable   condition, her family notified of her condition.         Raguel Severin, MD VG / ANGELES   D:  06/10/2017   07:46   T:  06/10/2017   08:11   Job #:  [id number]

## 2017-06-10 NOTE — ROUTINE PROCESS
Primary Nurse Ady Holloway RN and Lindsay Francis RN performed a dual skin assessment on this patient Impairment noted- see wound doc flow sheet.

## 2017-06-10 NOTE — ROUTINE PROCESS
Orthopedic & Surgical Nursing Communication Tool      7:57 AM  6/10/2017     Bedside and Verbal shift change report given to Miya Dexter RN (incoming nurse) by Jojo Garcia RN (outgoing nurse) on Rey Scott a 69 y.o. female who was admitted on 6/9/2017  7:07 AM. Report included the following information SBAR, Kardex and MAR. Patient ambulating to the bathroom w/ assistance & urinating. Patient concerned that her pain is worse this time compared to her previous surgery. Patient given roxicodone & morphine for pain. Patient states that qball not working, RN verified qball w/ patient and qball is working fine. Oxycontin to start this morning. Opportunity for questions and clarifications were given to the incoming nurse. Patient's bed is in low position, side rails x2, door open PRN, call bell within reach and patient not in distress.     Jojo Garcia RN

## 2019-03-06 ENCOUNTER — IP HISTORICAL/CONVERTED ENCOUNTER (OUTPATIENT)
Dept: OTHER | Age: 69
End: 2019-03-06

## 2019-05-21 ENCOUNTER — OP HISTORICAL/CONVERTED ENCOUNTER (OUTPATIENT)
Dept: OTHER | Age: 69
End: 2019-05-21

## 2019-10-20 ENCOUNTER — ED HISTORICAL/CONVERTED ENCOUNTER (OUTPATIENT)
Dept: OTHER | Age: 69
End: 2019-10-20

## 2020-05-15 ENCOUNTER — IP HISTORICAL/CONVERTED ENCOUNTER (OUTPATIENT)
Dept: OTHER | Age: 70
End: 2020-05-15

## 2020-06-24 ENCOUNTER — OP HISTORICAL/CONVERTED ENCOUNTER (OUTPATIENT)
Dept: OTHER | Age: 70
End: 2020-06-24

## 2021-02-09 ENCOUNTER — APPOINTMENT (OUTPATIENT)
Dept: GENERAL RADIOLOGY | Age: 71
End: 2021-02-09
Attending: PHYSICIAN ASSISTANT
Payer: MEDICARE

## 2021-02-09 ENCOUNTER — HOSPITAL ENCOUNTER (EMERGENCY)
Age: 71
Discharge: HOME | End: 2021-02-09
Payer: MEDICARE

## 2021-02-09 VITALS
HEIGHT: 64 IN | RESPIRATION RATE: 18 BRPM | WEIGHT: 207 LBS | OXYGEN SATURATION: 100 % | HEART RATE: 98 BPM | SYSTOLIC BLOOD PRESSURE: 137 MMHG | DIASTOLIC BLOOD PRESSURE: 86 MMHG | TEMPERATURE: 98.1 F | BODY MASS INDEX: 35.34 KG/M2
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
## 2021-02-09 DIAGNOSIS — M51.36 DDD (DEGENERATIVE DISC DISEASE), LUMBAR: ICD-10-CM

## 2021-02-09 PROCEDURE — 72170 X-RAY EXAM OF PELVIS: CPT

## 2021-02-09 PROCEDURE — 72100 X-RAY EXAM L-S SPINE 2/3 VWS: CPT

## 2021-02-09 PROCEDURE — 74011250637 HC RX REV CODE- 250/637: Performed by: PHYSICIAN ASSISTANT

## 2021-02-09 PROCEDURE — 72220 X-RAY EXAM SACRUM TAILBONE: CPT

## 2021-02-09 PROCEDURE — 99283 EMERGENCY DEPT VISIT LOW MDM: CPT

## 2021-02-09 RX ORDER — HYDROCODONE BITARTRATE AND ACETAMINOPHEN 5; 325 MG/1; MG/1
1 TABLET ORAL
Status: COMPLETED | OUTPATIENT
Start: 2021-02-09 | End: 2021-02-09

## 2021-02-09 RX ADMIN — HYDROCODONE BITARTRATE AND ACETAMINOPHEN 1 TABLET: 5; 325 TABLET ORAL at 16:14

## 2021-02-09 NOTE — ED PROVIDER NOTES
EMERGENCY DEPARTMENT HISTORY AND PHYSICAL EXAM      Date: 2/9/2021  Patient Name: Zayda Wilson    History of Presenting Illness     Chief Complaint   Patient presents with    Fall       History Provided By: Patient    HPI: Zayda Wilson, 65 y.o. female with a past medical history significant for a-fib, chronic back pain under pain contract, CVA, hyperlipidemia who presents to the ED with cc of sudden onset, gradually worsening, achy sacral and lower back pain which started PTA secondary to [facility]. Symptoms exacerbated with ambulation. Alleviated with rest. No other associated symptoms. Patient states she was opening the door and her cat was trying to get out of the house, which caused patient to trip and fall directly onto her buttocks. No head trauma or LOC. Patient has been ambulatory with her cane, which is her baseline. Has history of similar fall with subsequent lumbar compression fracture. Patient denies any extremity numbness, tingling, weakness, urinary/fecal incontinence, urinary retention, difficulty urinating, saddle anesthesia, gait problems, fever, chills, chest pain, shortness of breath, nausea, vomiting, diarrhea. There are no other complaints, changes, or physical findings at this time. PCP: Carlos, MD Rand    No current facility-administered medications on file prior to encounter. Current Outpatient Medications on File Prior to Encounter   Medication Sig Dispense Refill    HYDROcodone bitartrate (HYSINGLA ER) 30 mg ER tablet Take 2 Tabs by mouth daily. Max Daily Amount: 60 mg. *DO NOT CRUSH,CHEW, OR DISSOLVE TABLET* 14 Tab 0    oxyCODONE IR (ROXICODONE) 5 mg immediate release tablet Take 1-2 Tabs by mouth every three (3) hours as needed. Max Daily Amount: 80 mg. 90 Tab 0    ondansetron (ZOFRAN ODT) 4 mg disintegrating tablet Take 1 Tab by mouth every eight (8) hours as needed for Nausea.  30 Tab 0    mupirocin (BACTROBAN) 2 % ointment Apply pea size amount inside edge of each nostril with Q tip. Squeeze nostrils together to spread medication. Use twice daily x5 days 22 g 0    atorvastatin (LIPITOR) 40 mg tablet Take 40 mg by mouth daily.  warfarin (COUMADIN) 5 mg tablet Take 5 mg by mouth every Tuesday, Thursday, Saturday & Sunday.  EPINEPHrine (EPIPEN) 0.3 mg/0.3 mL injection 0.3 mg by IntraMUSCular route once as needed.  digoxin (DIGITEK) 0.125 mg tablet Take 0.125 mg by mouth daily.  WARFARIN SODIUM (COUMADIN PO) Take 6 mg by mouth every Monday, Wednesday, Friday.  aspirin delayed-release 81 mg tablet Take 81 mg by mouth daily. Pt states she frequently does not take this medicine      coenzyme q10 10 mg cap Take 1 Cap by mouth daily.  b complex vitamins tablet Take 1 Tab by mouth daily.  atenolol (TENORMIN) 50 mg tablet Take 50 mg by mouth daily. Indications: VENTRICULAR RATE CONTROL IN ATRIAL FIBRILLATION      diltiazem XR (DILACOR XR) 120 mg XR capsule Take  by mouth two (2) times a day.  fenofibrate micronized (LOFIBRA) 200 mg capsule Take  by mouth nightly.  metFORMIN (GLUCOPHAGE) 500 mg tablet Take 1,000 mg by mouth two (2) times daily (with meals).  nitroglycerin (NITROSTAT) 0.4 mg SL tablet by SubLINGual route every five (5) minutes as needed for Chest Pain.          Past History     Past Medical History:  Past Medical History:   Diagnosis Date    [facility]     Aneurysm ([facility]      hx decending aortic aneurysm  states \"not there now\"  present on 2013 duplex imaging\"     Arrhythmia     \"fast heart beat\"    Arthritis     Autoimmune disease ([facility]     retroperitoneal fibrosis-  seen [facility] for Rx    Burning with urination     Chronic pain     sees pain management MD for chronic back pain Dr Zonia Gre[facility])     Fatigue     Hypercholesterolemia     Ill-defined condition     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    Incontinence in female     Muscle weakness     Retroperitoneal fibrosis     aorta    Stroke ([facility] 01/25/2016    only deficit is loss of right peripheral visit    Tinnitus        Past Surgical History:  Past Surgical History:   Procedure Laterality Date    HX ADENOIDECTOMY      childhood    HX GYN  2010    tubes & ovaries removed    HX HEART CATHETERIZATION      X2- no blockages found    HX SHOULDER REPLACEMENT Left 2016    HX TONSILLECTOMY      childhood       Family History:  Family History   Problem Relation Age of Onset    Dementia Mother     Heart Disease Mother     Coronary Artery Disease Mother         had open heart surgery    Diabetes Mother     Heart Disease Father     Heart Disease Maternal Grandmother     Heart Disease Maternal Grandfather     Heart Disease Paternal Grandmother     Heart Disease Paternal Grandfather     Heart Disease Brother        Social History:  Social History     Tobacco Use    Smoking status: Never Smoker    Smokeless tobacco: Never Used   Substance Use Topics    Alcohol use: No    Drug use: No       Allergies: Allergies   Allergen Reactions    Bee Sting [Sting, Bee] Anaphylaxis     Carries an epi pen    Ciprofloxacin Hives, Rash and Itching    Flagyl [Metronidazole] Rash and Nausea and Vomiting    Pcn [Penicillins] Rash     Pt states she can take keflex without problem         Review of Systems     Review of Systems   Constitutional: Negative for chills, fatigue and fever. HENT: Negative. Respiratory: Negative for chest tightness and shortness of breath. Cardiovascular: Negative for chest pain. Gastrointestinal: Negative for abdominal pain, diarrhea, nausea and vomiting. Genitourinary: Negative for difficulty urinating, frequency and urgency. Denies urinary retention   Musculoskeletal: Positive for back pain. Skin: Negative for rash. Neurological: Negative for dizziness, weakness, light-headedness and headaches.         Denies extremity numbness, tingling, weakness, saddle anesthesia, urinary/fecal incontinence Psychiatric/Behavioral: Negative. All other systems reviewed and are negative. Physical Exam     Physical Exam  Vitals signs and nursing note reviewed. Constitutional:       General: She is not in acute distress. Appearance: Normal appearance. She is not ill-appearing or toxic-appearing. HENT:      Head: Normocephalic and atraumatic. Nose: Nose normal.      Mouth/Throat:      Mouth: Mucous membranes are moist.   Eyes:      General: Lids are normal.      Conjunctiva/sclera:      Right eye: Right conjunctiva is not injected. Left eye: Left conjunctiva is not injected. Neck:      Musculoskeletal: Normal range of motion and neck supple. Cardiovascular:      Rate and Rhythm: Normal rate and regular rhythm. Heart sounds: No murmur. No friction rub. No gallop. Pulmonary:      Effort: Pulmonary effort is normal. No tachypnea or respiratory distress. Breath sounds: Normal breath sounds. No stridor or decreased air movement. Musculoskeletal: Normal range of motion. General: No swelling, tenderness, deformity or signs of injury. Right lower leg: No edema. Left lower leg: No edema. Comments: Back: No midline tenderness, no step off. +R lumbar paraspinal tenderness. Negative SLR  Sensation intact distally. Strength 5/5 in BL lower extremities   Skin:     General: Skin is warm. Capillary Refill: Capillary refill takes less than 2 seconds. Neurological:      General: No focal deficit present. Mental Status: She is alert and oriented to person, place, and time. Mental status is at baseline. Psychiatric:         Mood and Affect: Mood normal.         Behavior: Behavior is cooperative. Thought Content: Thought content normal.         Judgment: Judgment normal.         Lab and Diagnostic Study Results     Labs -   No results found for this or any previous visit (from the past 12 hour(s)).     Radiologic Studies -   XR Results (most recent):  Results from [place] encounter on 02/09/21   XR PELV 1 OR 2 V    Narrative 2 view    Moderate narrowing of the medial joint spaces, symmetric, with some surrounding  bone hypertrophy. Superior hip joint spaces are maintained and therefore,  question underlying erosive predisposition. No fracture     Study Result    2 view     Advanced DDD at every level. Advanced diffuse facet DJD. No fracture or bone  destruction. Rotatory levoscoliosis   Imaging    XR SPINE LUMB 2 OR 3 V (Order: [id number]) - 2/9/2021      Study Result    2 view     No fracture or bone destruction. Advanced degenerative changes in the spine and  hips   Imaging    XR SACRUM AND COCCYX (Order: [id number]) - 2/9/2021      Medical Decision Making   - I am the first provider for this patient. - I reviewed the vital signs, available nursing notes, past medical history, past surgical history, family history and social history. - Initial assessment performed. The patients presenting problems have been discussed, and they are in agreement with the care plan formulated and outlined with them. I have encouraged them to ask questions as they arise throughout their visit. Vital Signs-Reviewed the patient's vital signs. Patient Vitals for the past 12 hrs:   Temp Pulse Resp BP SpO2   02/09/21 1435 98.1 °F (36.7 °C) 98 18 137/86 100 %       Records Reviewed: Nursing Notes and Old Medical Records    The patient presents with fal with a differential diagnosis of fracture, strain, contusion      ED Course:          Provider Notes (Medical Decision Making):     MDM  Number of Diagnoses or Management Options  DDD (degenerative disc disease), lumbar  Fall, initial encounter  Diagnosis management comments:     65year old female with lower back/sacral pain. No alarm symptoms. XR negative for acute process. She is ambulatory with her cane. She is under pain contract and has opioid analgesics at home already.  Recommend warm compresses to the area of symptomatic relief. No other injuries requiring further workup. Return precautions discussed. Amount and/or Complexity of Data Reviewed  Tests in the radiology section of CPT®: ordered and reviewed  Review and summarize past medical records: yes    Patient Progress  Patient progress: stable           Disposition   Disposition: DC- Adult Discharges: All of the diagnostic tests were reviewed and questions answered. Diagnosis, care plan and treatment options were discussed. The patient understands the instructions and will follow up as directed. The patients results have been reviewed with them. They have been counseled regarding their diagnosis. The patient verbally convey understanding and agreement of the signs, symptoms, diagnosis, treatment and prognosis and additionally agrees to follow up as recommended with their PCP in 24 - 48 hours. They also agree with the care-plan and convey that all of their questions have been answered. I have also put together some discharge instructions for them that include: 1) educational information regarding their diagnosis, 2) how to care for their diagnosis at home, as well a 3) list of reasons why they would want to return to the ED prior to their follow-up appointment, should their condition change. Discharged    DISCHARGE PLAN:  1. Current Discharge Medication List      CONTINUE these medications which have NOT CHANGED    Details   HYDROcodone bitartrate (HYSINGLA ER) 30 mg ER tablet Take 2 Tabs by mouth daily. Max Daily Amount: 60 mg. *DO NOT CRUSH,CHEW, OR DISSOLVE TABLET*  Qty: 14 Tab, Refills: 0      oxyCODONE IR (ROXICODONE) 5 mg immediate release tablet Take 1-2 Tabs by mouth every three (3) hours as needed. Max Daily Amount: 80 mg.  Qty: 90 Tab, Refills: 0      ondansetron (ZOFRAN ODT) 4 mg disintegrating tablet Take 1 Tab by mouth every eight (8) hours as needed for Nausea.   Qty: 30 Tab, Refills: 0      mupirocin (BACTROBAN) 2 % ointment Apply pea size amount inside edge of each nostril with Q tip. Squeeze nostrils together to spread medication. Use twice daily x5 days  Qty: 22 g, Refills: 0      atorvastatin (LIPITOR) 40 mg tablet Take 40 mg by mouth daily. !! warfarin (COUMADIN) 5 mg tablet Take 5 mg by mouth every Tuesday, Thursday, Saturday & Sunday. EPINEPHrine (EPIPEN) 0.3 mg/0.3 mL injection 0.3 mg by IntraMUSCular route once as needed. digoxin (DIGITEK) 0.125 mg tablet Take 0.125 mg by mouth daily. !! WARFARIN SODIUM (COUMADIN PO) Take 6 mg by mouth every Monday, Wednesday, Friday. aspirin delayed-release 81 mg tablet Take 81 mg by mouth daily. Pt states she frequently does not take this medicine      coenzyme q10 10 mg cap Take 1 Cap by mouth daily. b complex vitamins tablet Take 1 Tab by mouth daily. atenolol (TENORMIN) 50 mg tablet Take 50 mg by mouth daily. Indications: VENTRICULAR RATE CONTROL IN ATRIAL FIBRILLATION      diltiazem XR (DILACOR XR) 120 mg XR capsule Take  by mouth two (2) times a day. fenofibrate micronized (LOFIBRA) 200 mg capsule Take  by mouth nightly. metFORMIN (GLUCOPHAGE) 500 mg tablet Take 1,000 mg by mouth two (2) times daily (with meals). nitroglycerin (NITROSTAT) 0.4 mg SL tablet by SubLINGual route every five (5) minutes as needed for Chest Pain. !! - Potential duplicate medications found. Please discuss with provider. 2.   Follow-up Information     Follow up With Specialties Details Why [facility] EMERGENCY DEPT Emergency Medicine  As needed, If symptoms worsen [address] [id number]  [phone]    Primary Care Provider  In 2 days As needed         3. Return to ED if worse   4. Discharge Medication List as of 2/9/2021  4:51 PM            Diagnosis     Clinical Impression:   1. Fall, initial encounter    2.  DDD (degenerative disc disease), lumbar        Attestations:    CB Worley    Please note that this dictation was completed with [employer], the [employer] voice recognition software. Quite often unanticipated grammatical, syntax, homophones, and other interpretive errors are inadvertently transcribed by the computer software. Please disregard these errors. Please excuse any errors that have escaped final proofreading. Thank you. Normal vision: sees adequately in most situations; can see medication labels, newsprint

## 2021-02-09 NOTE — ED TRIAGE NOTES
[facility] trying to avoid cat getting out of kitchen door this morning, now with sacral pain

## 2021-02-25 ENCOUNTER — HOSPITAL ENCOUNTER (INPATIENT)
Age: 71
LOS: 1 days | Discharge: HOME | DRG: 310 | End: 2021-02-26
Attending: EMERGENCY MEDICINE | Admitting: INTERNAL MEDICINE
Payer: MEDICARE

## 2021-02-25 ENCOUNTER — APPOINTMENT (OUTPATIENT)
Dept: GENERAL RADIOLOGY | Age: 71
DRG: 310 | End: 2021-02-25
Attending: EMERGENCY MEDICINE
Payer: MEDICARE

## 2021-02-25 DIAGNOSIS — E87.6 HYPOKALEMIA: ICD-10-CM

## 2021-02-25 DIAGNOSIS — I48.91 ATRIAL FIBRILLATION WITH RAPID VENTRICULAR RESPONSE (HCC): Primary | ICD-10-CM

## 2021-02-25 DIAGNOSIS — E83.42 HYPOMAGNESEMIA: ICD-10-CM

## 2021-02-25 LAB
ANION GAP SERPL CALC-SCNC: 7 MMOL/L (ref 5–15)
BASOPHILS # BLD: 0 K/UL (ref 0–0.1)
BASOPHILS NFR BLD: 1 % (ref 0–1)
BUN SERPL-MCNC: 21 MG/DL (ref 6–20)
BUN/CREAT SERPL: 26 (ref 12–20)
CA-I BLD-MCNC: 8.4 MG/DL (ref 8.5–10.1)
CHLORIDE SERPL-SCNC: 104 MMOL/L (ref 97–108)
CO2 SERPL-SCNC: 28 MMOL/L (ref 21–32)
CREAT SERPL-MCNC: 0.82 MG/DL (ref 0.55–1.02)
DIFFERENTIAL METHOD BLD: ABNORMAL
EOSINOPHIL # BLD: 0.1 K/UL (ref 0–0.4)
EOSINOPHIL NFR BLD: 1 % (ref 0–7)
ERYTHROCYTE [DISTWIDTH] IN BLOOD BY AUTOMATED COUNT: 14.7 % (ref 11.5–14.5)
GLUCOSE BLD STRIP.AUTO-MCNC: 95 MG/DL (ref 65–100)
GLUCOSE SERPL-MCNC: 102 MG/DL (ref 65–100)
HCT VFR BLD AUTO: 39.9 % (ref 35–47)
HGB BLD-MCNC: 12.9 G/DL (ref 11.5–16)
IMM GRANULOCYTES # BLD AUTO: 0 K/UL (ref 0–0.04)
IMM GRANULOCYTES NFR BLD AUTO: 0 % (ref 0–0.5)
LYMPHOCYTES # BLD: 1.7 K/UL (ref 0.8–3.5)
LYMPHOCYTES NFR BLD: 28 % (ref 12–49)
MAGNESIUM SERPL-MCNC: 1.4 MG/DL (ref 1.6–2.4)
MCH RBC QN AUTO: 30.8 PG (ref 26–34)
MCHC RBC AUTO-ENTMCNC: 32.3 G/DL (ref 30–36.5)
MCV RBC AUTO: 95.2 FL (ref 80–99)
MONOCYTES # BLD: 0.5 K/UL (ref 0–1)
MONOCYTES NFR BLD: 8 % (ref 5–13)
NEUTS SEG # BLD: 3.7 K/UL (ref 1.8–8)
NEUTS SEG NFR BLD: 62 % (ref 32–75)
PERFORMED BY, TECHID: NORMAL
PLATELET # BLD AUTO: 313 K/UL (ref 150–400)
PMV BLD AUTO: 10.4 FL (ref 8.9–12.9)
POTASSIUM SERPL-SCNC: 3.1 MMOL/L (ref 3.5–5.1)
RBC # BLD AUTO: 4.19 M/UL (ref 3.8–5.2)
SODIUM SERPL-SCNC: 139 MMOL/L (ref 136–145)
TROPONIN I SERPL-MCNC: <0.05 NG/ML
WBC # BLD AUTO: 6 K/UL (ref 3.6–11)

## 2021-02-25 PROCEDURE — 80048 BASIC METABOLIC PNL TOTAL CA: CPT

## 2021-02-25 PROCEDURE — 96374 THER/PROPH/DIAG INJ IV PUSH: CPT

## 2021-02-25 PROCEDURE — 65270000029 HC RM PRIVATE

## 2021-02-25 PROCEDURE — 36415 COLL VENOUS BLD VENIPUNCTURE: CPT

## 2021-02-25 PROCEDURE — 82962 GLUCOSE BLOOD TEST: CPT

## 2021-02-25 PROCEDURE — 71045 X-RAY EXAM CHEST 1 VIEW: CPT

## 2021-02-25 PROCEDURE — 74011000250 HC RX REV CODE- 250: Performed by: EMERGENCY MEDICINE

## 2021-02-25 PROCEDURE — 83735 ASSAY OF MAGNESIUM: CPT

## 2021-02-25 PROCEDURE — 93005 ELECTROCARDIOGRAM TRACING: CPT

## 2021-02-25 PROCEDURE — 99285 EMERGENCY DEPT VISIT HI MDM: CPT

## 2021-02-25 PROCEDURE — 74011250637 HC RX REV CODE- 250/637: Performed by: EMERGENCY MEDICINE

## 2021-02-25 PROCEDURE — 84484 ASSAY OF TROPONIN QUANT: CPT

## 2021-02-25 PROCEDURE — 74011250636 HC RX REV CODE- 250/636: Performed by: EMERGENCY MEDICINE

## 2021-02-25 PROCEDURE — 85025 COMPLETE CBC W/AUTO DIFF WBC: CPT

## 2021-02-25 PROCEDURE — 96375 TX/PRO/DX INJ NEW DRUG ADDON: CPT

## 2021-02-25 RX ORDER — ATORVASTATIN CALCIUM 40 MG/1
40 TABLET, FILM COATED ORAL DAILY
Status: CANCELLED | OUTPATIENT
Start: 2021-02-26

## 2021-02-25 RX ORDER — DILTIAZEM HYDROCHLORIDE 120 MG/1
120 CAPSULE, EXTENDED RELEASE ORAL 2 TIMES DAILY
Status: CANCELLED | OUTPATIENT
Start: 2021-02-25

## 2021-02-25 RX ORDER — INSULIN LISPRO 100 [IU]/ML
INJECTION, SOLUTION INTRAVENOUS; SUBCUTANEOUS
Status: DISCONTINUED | OUTPATIENT
Start: 2021-02-25 | End: 2021-02-26 | Stop reason: HOSPADM

## 2021-02-25 RX ORDER — DILTIAZEM HYDROCHLORIDE 5 MG/ML
15 INJECTION INTRAVENOUS
Status: COMPLETED | OUTPATIENT
Start: 2021-02-25 | End: 2021-02-25

## 2021-02-25 RX ORDER — PROMETHAZINE HYDROCHLORIDE 25 MG/1
12.5 TABLET ORAL
Status: DISCONTINUED | OUTPATIENT
Start: 2021-02-25 | End: 2021-02-26 | Stop reason: HOSPADM

## 2021-02-25 RX ORDER — SODIUM CHLORIDE 0.9 % (FLUSH) 0.9 %
5-40 SYRINGE (ML) INJECTION AS NEEDED
Status: DISCONTINUED | OUTPATIENT
Start: 2021-02-25 | End: 2021-02-26 | Stop reason: HOSPADM

## 2021-02-25 RX ORDER — ASPIRIN 81 MG/1
81 TABLET ORAL DAILY
Status: CANCELLED | OUTPATIENT
Start: 2021-02-26

## 2021-02-25 RX ORDER — HYDROCODONE BITARTRATE AND ACETAMINOPHEN 5; 325 MG/1; MG/1
1 TABLET ORAL
Status: CANCELLED | OUTPATIENT
Start: 2021-02-25

## 2021-02-25 RX ORDER — POTASSIUM CHLORIDE 750 MG/1
40 TABLET, FILM COATED, EXTENDED RELEASE ORAL
Status: COMPLETED | OUTPATIENT
Start: 2021-02-25 | End: 2021-02-25

## 2021-02-25 RX ORDER — DEXTROSE 50 % IN WATER (D50W) INTRAVENOUS SYRINGE
25-50 AS NEEDED
Status: DISCONTINUED | OUTPATIENT
Start: 2021-02-25 | End: 2021-02-26 | Stop reason: HOSPADM

## 2021-02-25 RX ORDER — FUROSEMIDE 20 MG/1
20 TABLET ORAL DAILY
COMMUNITY
Start: 2021-02-15

## 2021-02-25 RX ORDER — SODIUM CHLORIDE 0.9 % (FLUSH) 0.9 %
5-40 SYRINGE (ML) INJECTION EVERY 8 HOURS
Status: DISCONTINUED | OUTPATIENT
Start: 2021-02-25 | End: 2021-02-26 | Stop reason: HOSPADM

## 2021-02-25 RX ORDER — ONDANSETRON 2 MG/ML
4 INJECTION INTRAMUSCULAR; INTRAVENOUS
Status: DISCONTINUED | OUTPATIENT
Start: 2021-02-25 | End: 2021-02-26 | Stop reason: HOSPADM

## 2021-02-25 RX ORDER — MAGNESIUM SULFATE 100 %
4 CRYSTALS MISCELLANEOUS AS NEEDED
Status: DISCONTINUED | OUTPATIENT
Start: 2021-02-25 | End: 2021-02-26 | Stop reason: HOSPADM

## 2021-02-25 RX ORDER — ACETAMINOPHEN 325 MG/1
650 TABLET ORAL
Status: DISCONTINUED | OUTPATIENT
Start: 2021-02-25 | End: 2021-02-26 | Stop reason: HOSPADM

## 2021-02-25 RX ORDER — HYDROCODONE BITARTRATE AND ACETAMINOPHEN 5; 325 MG/1; MG/1
1 TABLET ORAL
COMMUNITY
End: 2021-04-27

## 2021-02-25 RX ORDER — MAGNESIUM SULFATE HEPTAHYDRATE 40 MG/ML
2 INJECTION, SOLUTION INTRAVENOUS
Status: COMPLETED | OUTPATIENT
Start: 2021-02-25 | End: 2021-02-25

## 2021-02-25 RX ORDER — POLYETHYLENE GLYCOL 3350 17 G/17G
17 POWDER, FOR SOLUTION ORAL DAILY PRN
Status: DISCONTINUED | OUTPATIENT
Start: 2021-02-25 | End: 2021-02-26 | Stop reason: HOSPADM

## 2021-02-25 RX ORDER — ACETAMINOPHEN 650 MG/1
650 SUPPOSITORY RECTAL
Status: DISCONTINUED | OUTPATIENT
Start: 2021-02-25 | End: 2021-02-26 | Stop reason: HOSPADM

## 2021-02-25 RX ADMIN — DILTIAZEM HYDROCHLORIDE 15 MG: 5 INJECTION INTRAVENOUS at 18:38

## 2021-02-25 RX ADMIN — POTASSIUM CHLORIDE 40 MEQ: 750 TABLET, FILM COATED, EXTENDED RELEASE ORAL at 20:47

## 2021-02-25 RX ADMIN — MAGNESIUM SULFATE HEPTAHYDRATE 2 G: 40 INJECTION, SOLUTION INTRAVENOUS at 19:45

## 2021-02-25 RX ADMIN — Medication 10 ML: at 22:00

## 2021-02-25 NOTE — ED TRIAGE NOTES
Pt to ED via EMS from her cardiologist office for a visit today and she was A-fib RVR on their EKG reading. Arrives A&Ox4 with chest palpitations. Pt took NTG sublingual x2 without relief of chest discomfort. Denies dizziness, shortness of breath, palpitations or any other s/s.

## 2021-02-25 NOTE — Clinical Note
Status[de-identified] INPATIENT [101]   Type of Bed: Telemetry [19]   Inpatient Hospitalization Certified Necessary for the Following Reasons: 3.  Patient receiving treatment that can only be provided in an inpatient setting (further clarification in H&P documentation)   Admitting Diagnosis: Atrial fibrillation with RVR [facility]) [[id number]]   Admitting Physician: Inessa Alvarado [[id number]]   Attending Physician: Inessa Alvarado [[id number]]   Estimated Length of Stay: 3-4 Midnights   Discharge Plan[de-identified] Home with Office Follow-up

## 2021-02-26 VITALS
HEART RATE: 71 BPM | HEIGHT: 64 IN | BODY MASS INDEX: 35 KG/M2 | SYSTOLIC BLOOD PRESSURE: 148 MMHG | TEMPERATURE: 98.5 F | DIASTOLIC BLOOD PRESSURE: 73 MMHG | OXYGEN SATURATION: 99 % | WEIGHT: 205 LBS | RESPIRATION RATE: 19 BRPM

## 2021-02-26 PROBLEM — I48.91 ATRIAL FIBRILLATION (HCC): Status: ACTIVE | Noted: 2021-02-26

## 2021-02-26 LAB
ANION GAP SERPL CALC-SCNC: 2 MMOL/L (ref 5–15)
ATRIAL RATE: 312 BPM
BUN SERPL-MCNC: 19 MG/DL (ref 6–20)
BUN/CREAT SERPL: 26 (ref 12–20)
CA-I BLD-MCNC: 8.6 MG/DL (ref 8.5–10.1)
CALCULATED R AXIS, ECG10: 33 DEGREES
CALCULATED T AXIS, ECG11: 72 DEGREES
CHLORIDE SERPL-SCNC: 102 MMOL/L (ref 97–108)
CO2 SERPL-SCNC: 34 MMOL/L (ref 21–32)
CREAT SERPL-MCNC: 0.74 MG/DL (ref 0.55–1.02)
DIAGNOSIS, 93000: NORMAL
ERYTHROCYTE [DISTWIDTH] IN BLOOD BY AUTOMATED COUNT: 15.1 % (ref 11.5–14.5)
EST. AVERAGE GLUCOSE BLD GHB EST-MCNC: 131 MG/DL
GLUCOSE BLD STRIP.AUTO-MCNC: 157 MG/DL (ref 65–100)
GLUCOSE SERPL-MCNC: 96 MG/DL (ref 65–100)
HBA1C MFR BLD: 6.2 % (ref 4–5.6)
HCT VFR BLD AUTO: 41 % (ref 35–47)
HGB BLD-MCNC: 13.4 G/DL (ref 11.5–16)
MAGNESIUM SERPL-MCNC: 1.5 MG/DL (ref 1.6–2.4)
MCH RBC QN AUTO: 31.4 PG (ref 26–34)
MCHC RBC AUTO-ENTMCNC: 32.7 G/DL (ref 30–36.5)
MCV RBC AUTO: 96 FL (ref 80–99)
PERFORMED BY, TECHID: ABNORMAL
PLATELET # BLD AUTO: 352 K/UL (ref 150–400)
PMV BLD AUTO: 10.7 FL (ref 8.9–12.9)
POTASSIUM SERPL-SCNC: 3.8 MMOL/L (ref 3.5–5.1)
Q-T INTERVAL, ECG07: 318 MS
QRS DURATION, ECG06: 80 MS
QTC CALCULATION (BEZET), ECG08: 445 MS
RBC # BLD AUTO: 4.27 M/UL (ref 3.8–5.2)
SODIUM SERPL-SCNC: 138 MMOL/L (ref 136–145)
VENTRICULAR RATE, ECG03: 118 BPM
WBC # BLD AUTO: 6.6 K/UL (ref 3.6–11)

## 2021-02-26 PROCEDURE — 83036 HEMOGLOBIN GLYCOSYLATED A1C: CPT

## 2021-02-26 PROCEDURE — 85027 COMPLETE CBC AUTOMATED: CPT

## 2021-02-26 PROCEDURE — 82962 GLUCOSE BLOOD TEST: CPT

## 2021-02-26 PROCEDURE — 36415 COLL VENOUS BLD VENIPUNCTURE: CPT

## 2021-02-26 PROCEDURE — 74011250636 HC RX REV CODE- 250/636: Performed by: FAMILY MEDICINE

## 2021-02-26 PROCEDURE — 80048 BASIC METABOLIC PNL TOTAL CA: CPT

## 2021-02-26 PROCEDURE — 83735 ASSAY OF MAGNESIUM: CPT

## 2021-02-26 PROCEDURE — 74011250637 HC RX REV CODE- 250/637: Performed by: PHYSICIAN ASSISTANT

## 2021-02-26 RX ORDER — DILTIAZEM HYDROCHLORIDE 180 MG/1
180 CAPSULE, EXTENDED RELEASE ORAL DAILY
Qty: 30 CAP | Refills: 1 | Status: SHIPPED | OUTPATIENT
Start: 2021-02-26 | End: 2021-04-27 | Stop reason: DRUGHIGH

## 2021-02-26 RX ORDER — DILTIAZEM HYDROCHLORIDE 180 MG/1
180 CAPSULE, EXTENDED RELEASE ORAL ONCE
Status: COMPLETED | OUTPATIENT
Start: 2021-02-26 | End: 2021-02-26

## 2021-02-26 RX ADMIN — FAMOTIDINE 20 MG: 10 INJECTION INTRAVENOUS at 10:16

## 2021-02-26 RX ADMIN — DILTIAZEM HYDROCHLORIDE 180 MG: 180 CAPSULE, EXTENDED RELEASE ORAL at 11:03

## 2021-02-26 RX ADMIN — Medication 10 ML: at 06:27

## 2021-02-26 NOTE — DISCHARGE SUMMARY
Hospitalist Discharge Summary     Patient ID:    Chandni Garcia  [id number]  47 y.o.  1950    Admit date: 2/25/2021    Discharge date : 2/26/2021    Chronic Diagnoses:    Problem List as of 2/26/2021 Date Reviewed: 2/25/2016          Codes Class Noted - Resolved    Atrial fibrillation with RVR ([facility] ICD-10-CM: I48.91  ICD-9-CM: 427.31  2/25/2021 - Present        Osteoarthritis ICD-10-CM: M19.90  ICD-9-CM: 715.90  6/9/2017 - Present        DM (diabetes mellitus) ([facility] ICD-10-CM: E11.9  ICD-9-CM: 250.00  6/9/2017 - Present        Retroperitoneal fibrosis ICD-10-CM: N13.5  ICD-9-CM: 593.4  6/9/2017 - Present        Chronic a-fib ([facility] ICD-10-CM: I48.20  ICD-9-CM: 427.31  6/9/2017 - Present        AAA (abdominal aortic aneurysm) ([facility] ICD-10-CM: I71.4  ICD-9-CM: 441.4  6/9/2017 - Present        Osteoarthritis (arthritis due to wear and tear of joints) ICD-10-CM: M19.90  ICD-9-CM: 715.90  8/26/2016 - Present            Final Diagnoses:   1. Persistent atrial fibrillation with RVR  2. Type 2 diabetes  3. Hypertension  4. Hyperlipidemia    Reason for Hospitalization: Irregular heart beat      Hospital Course: Patient is a 75-year-old female who presented to the ER from her cardiologist office on 2/25/2021 for fatigue, shortness of breath, palpitations. At her cardiology office she was noted been atrial fibrillation with a heart rate i of 165. Patient said that she had been without her diltiazem at home for a couple of days. She has an appointment with a electrophysiologist for cardioversion next week. Patient was placed on IV diltiazem and heart rate better controlled. Patient denies any chest pain or shortness of breath. Have sent a prescription of patient's diltiazem 180 mg daily to her pharmacy. Per cardiology no further work-up warranted and she can be stable for discharge home.       Discharge Medications:   Current Discharge Medication List      CONTINUE these medications which have CHANGED    Details   dilTIAZem ER (DILACOR XR) 180 mg capsule Take 1 Cap by mouth daily. Qty: 30 Cap, Refills: 1         CONTINUE these medications which have NOT CHANGED    Details   furosemide (LASIX) 20 mg tablet Take 20 mg by mouth daily. HYDROcodone-acetaminophen (NORCO) 5-325 mg per tablet Take 1 Tab by mouth every six (6) hours as needed for Moderate Pain.      rivaroxaban (XARELTO) 20 mg tab tablet Take 20 mg by mouth nightly. ondansetron (ZOFRAN ODT) 4 mg disintegrating tablet Take 1 Tab by mouth every eight (8) hours as needed for Nausea. Qty: 30 Tab, Refills: 0      atorvastatin (LIPITOR) 40 mg tablet Take 40 mg by mouth daily. fenofibrate micronized (LOFIBRA) 200 mg capsule Take  by mouth nightly. metFORMIN (GLUCOPHAGE) 500 mg tablet Take 1,000 mg by mouth two (2) times daily (with meals). nitroglycerin (NITROSTAT) 0.4 mg SL tablet by SubLINGual route every five (5) minutes as needed for Chest Pain. EPINEPHrine (EPIPEN) 0.3 mg/0.3 mL injection 0.3 mg by IntraMUSCular route once as needed. aspirin delayed-release 81 mg tablet Take 81 mg by mouth daily. Pt states she frequently does not take this medicine      coenzyme q10 10 mg cap Take 1 Cap by mouth daily. b complex vitamins tablet Take 1 Tab by mouth daily. STOP taking these medications       HYDROcodone bitartrate (HYSINGLA ER) 30 mg ER tablet Comments:   Reason for Stopping:         mupirocin (BACTROBAN) 2 % ointment Comments:   Reason for Stopping:         warfarin (COUMADIN) 5 mg tablet Comments:   Reason for Stopping:         digoxin (DIGITEK) 0.125 mg tablet Comments:   Reason for Stopping:         WARFARIN SODIUM (COUMADIN PO) Comments:   Reason for Stopping:         atenolol (TENORMIN) 50 mg tablet Comments:   Reason for Stopping: Follow up Care:    1. Other, MD Rand in 1-2 weeks.       Follow-up Information     Follow up With Specialties Details Why [address] [facility], Alexandra Roach MD Cardiology, [address] Vascular Surgery, Internal Medicine, Interventional Cardiology In 1 week  [address] [facility] [phone]      Other, MD Rand    Patient can only remember the practice name and not the physician              Patient Follow Up Instructions: Activity: Activity as tolerated  Diet:  Cardiac Diet    Condition at Discharge:  Stable  __________________________________________________________________    Disposition  Home with family, no needs  ____________________________________________________________________    Code Status: Full Code  ___________________________________________________________________    Discharge Exam:  Patient seen and examined by me on discharge day. Pertinent Findings:  Gen:    Not in distress  Chest: Clear lungs  CVS:   Irregular rhythm. No edema  Abd:  Soft, not distended, not tender  Neuro:  Alert    CONSULTATIONS: Cardiology    Significant Diagnostic Studies:   Recent Labs     02/26/21  0126 02/25/21  1645   WBC 6.6 6.0   HGB 13.4 12.9   HCT 41.0 39.9    313     Recent Labs     02/26/21  0126 02/25/21  1645    139   K 3.8 3.1*    104   CO2 34* 28   BUN 19 21*   CREA 0.74 0.82   GLU 96 102*   CA 8.6 8.4*   MG 1.5* 1.4*     No results for input(s): ALT, AP, TBIL, TBILI, TP, ALB, GLOB, GGT, AML, LPSE in the last 72 hours. No lab exists for component: SGOT, GPT, AMYP, HLPSE  No results for input(s): INR, PTP, APTT, INREXT in the last 72 hours. No results for input(s): FE, TIBC, PSAT, FERR in the last 72 hours. No results for input(s): PH, PCO2, PO2 in the last 72 hours. No results for input(s): CPK, CKMB in the last 72 hours.     No lab exists for component: TROPONINI  Lab Results   Component Value Date/Time    Glucose (POC) 95 02/25/2021 10:07 PM    Glucose (POC) 174 (H) 06/10/2017 11:45 AM    Glucose (POC) 126 (H) 06/10/2017 07:10 AM    Glucose (POC) 209 (H) 06/09/2017 09:28 PM    Glucose (POC) 212 (H) 06/09/2017 05:15 PM         Total Time: >30 min     Signed:  Soto Paez PA-C  2/26/2021  10:02 AM

## 2021-02-26 NOTE — CONSULTS
Consult    NAME: Mary Ann Lino   :  1950   MRN:  [id number]     Date/Time:  2021 9:18 AM    Patient PCP: Carlos, MD Rand  ________________________________________________________________________     Problem List:   -atrial fibrillation- on   -Diabetes mellitus  -dyslipidemia  -History of stroke  -AAA-stable  -Order mean disease  -Chronic fatigue syndrome  -Tinitus  -Arthritis      Assessment/Plan:   -70-year-old white female with history of atrial fibrillation admitted to the hospital with feeling fatigued and tired shortness of breath from our office with atrial fibrillation and RVR with heart rate of 165 bpm.  -Patient was not taking her cardiac exam for 2 days as she was ran out of it.  -Patient was transferred to the ER form our office yesterday where she stayed overnight and with the medications are heart rate in controlled and this morning it was 82 bpm but the rhythm was is still in atrial fibrillation.  -Patient started has appointment with the doctor which is very important for her and she does not want to stay until Monday for EDY cardioversion.  -As patient is clinically and hemodynamically stable I will continue Cardizem at home and discharge her from the ER see her primary cardiologist next week to schedule for EDY cardioversion if it is still necessary and clinically indicated  -Thank you for consultation and letting me participate in the care of our mutual patient. 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    female who was admitted don't the hospital from our office as she walked in with feeling fatigued tightness short of breath and an EKG shows atrial fibrillation with RVR and heart rate was 1 65 bpm.  Patient was stabilized in our office and immediately transferred to the emergency department via ambulance by calling 911. Patient stayed overnight and become stable and now wants to go home as her son has an appointment in [place] which is very important to her. Based on my overall clinical assessment she is stable to be discharged from the emergency department and we will see her in the office next week and evaluated her for possible EDY cardioversion versus medical management. We were asked to consult for work up and evaluation of the above problems.      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    Incontinence in female     Muscle weakness     Retroperitoneal fibrosis     aorta    Stroke ([address] 01/25/2016    only deficit is loss of right peripheral visit    Tinnitus       Past Surgical History:   Procedure Laterality Date    HX ADENOIDECTOMY      childhood    HX GYN  2010    tubes & ovaries removed    HX HEART CATHETERIZATION      X2- no blockages found    HX SHOULDER REPLACEMENT Left 2016    HX TONSILLECTOMY      childhood     Allergies   Allergen Reactions    Bee Sting [Sting, Bee] Anaphylaxis     Carries an epi pen    Ciprofloxacin Hives, Rash and Itching    Flagyl [Metronidazole] Rash and Nausea and Vomiting    Pcn [Penicillins] Rash     Pt states she can take keflex without problem      Meds:  See below  Social History     Tobacco Use    Smoking status: Never Smoker    Smokeless tobacco: Never Used   Substance Use Topics    Alcohol use: No      Family History   Problem Relation Age of Onset    Dementia Mother     Heart Disease Mother     Coronary Artery Disease Mother         had open heart surgery    Diabetes Mother     Heart Disease Father     Heart Disease Maternal Grandmother     Heart Disease Maternal Grandfather     Heart Disease Paternal Grandmother     Heart Disease Paternal Grandfather     Heart Disease Brother        REVIEW OF SYSTEMS:     []         Unable to obtain  ROS due to ---   [x]         Total of 12 systems reviewed as follows:    Constitutional: negative fever, negative chills, negative weight loss  Eyes:   negative visual changes  ENT:   negative sore throat, tongue or lip swelling  Respiratory:  negative cough, negative dyspnea  Cards:  negative for chest pain, palpitations, lower extremity edema  GI:   negative for nausea, vomiting, diarrhea, and abdominal pain  Genitourinary: negative for frequency, dysuria  Integument:  negative for rash   Hematologic:  negative for easy bruising and gum/nose bleeding  Musculoskel: negative for myalgias,  back pain  Neurological:  negative for headaches, dizziness, vertigo, weakness  Behavl/Psych: negative for feelings of anxiety, depression     Pertinent Positives include :    Objective:      Physical Exam:    Last 24hrs VS reviewed since prior progress note. Most recent are:    Visit Vitals  /83   Pulse 85   Temp 98.5 °F (36.9 °C)   Resp 16   Ht 5' 4\" (1.626 m)   Wt 93 kg (205 lb)   SpO2 99%   BMI 35.19 kg/m²     No intake or output data in the 24 hours ending 02/26/21 0918     General Appearance: Well developed, well nourished, alert & oriented x 3,    no acute distress. Ears/Nose/Mouth/Throat: Pupils equal and round, Hearing grossly normal.  Neck: Supple. JVP within normal limits.  Carotids good upstrokes, with no bruit. Chest: Lungs clear to auscultation bilaterally. Cardiovascular: irregularly irregular. No S3-S4 murmur or gallop  Abdomen: Soft, non-tender, bowel sounds are active. No organomegaly. Extremities: No edema bilaterally. Femoral pulses +2, Distal Pulses +1. Skin: Warm and dry. Neuro: CN II-XII grossly intact, Strength and sensation grossly intact. []         Post-cath site without hematoma, bruit, tenderness, or thrill. Distal pulses intact. XR CHEST SNGL V   Final Result   No plain film evidence for CHF or pneumonia. Data:      Telemetry:    EKG:  []  No new EKG for review. Prior to Admission medications    Medication Sig Start Date End Date Taking? Authorizing Provider   furosemide (LASIX) 20 mg tablet Take 20 mg by mouth daily. 2/15/21  Yes Provider, Historical   HYDROcodone-acetaminophen (NORCO) 5-325 mg per tablet Take 1 Tab by mouth every six (6) hours as needed for Moderate Pain. Yes Provider, Historical   rivaroxaban (XARELTO) 20 mg tab tablet Take 20 mg by mouth nightly. Yes Provider, Historical   ondansetron (ZOFRAN ODT) 4 mg disintegrating tablet Take 1 Tab by mouth every eight (8) hours as needed for Nausea. 6/10/17  Yes Amina Torres PA-C   atorvastatin (LIPITOR) 40 mg tablet Take 40 mg by mouth daily. Yes Provider, Historical   diltiazem XR (DILACOR XR) 120 mg XR capsule Take  by mouth two (2) times a day. Yes Provider, Historical   fenofibrate micronized (LOFIBRA) 200 mg capsule Take  by mouth nightly. Yes Provider, Historical   metFORMIN (GLUCOPHAGE) 500 mg tablet Take 1,000 mg by mouth two (2) times daily (with meals). Yes Provider, Historical   nitroglycerin (NITROSTAT) 0.4 mg SL tablet by SubLINGual route every five (5) minutes as needed for Chest Pain. Yes Provider, Historical   EPINEPHrine (EPIPEN) 0.3 mg/0.3 mL injection 0.3 mg by IntraMUSCular route once as needed.     Provider, Historical   aspirin delayed-release 81 mg tablet Take 81 mg by mouth daily. Pt states she frequently does not take this medicine    Provider, Historical   coenzyme q10 10 mg cap Take 1 Cap by mouth daily. Provider, Historical   b complex vitamins tablet Take 1 Tab by mouth daily. Provider, Historical       Recent Results (from the past 24 hour(s))   CBC WITH AUTOMATED DIFF    Collection Time: 02/25/21  4:45 PM   Result Value Ref Range    WBC 6.0 3.6 - 11.0 K/uL    RBC 4.19 3.80 - 5.20 M/uL    HGB 12.9 11.5 - 16.0 g/dL    HCT 39.9 35.0 - 47.0 %    MCV 95.2 80.0 - 99.0 FL    MCH 30.8 26.0 - 34.0 PG    MCHC 32.3 30.0 - 36.5 g/dL    RDW 14.7 (H) 11.5 - 14.5 %    PLATELET 846 541 - 831 K/uL    MPV 10.4 8.9 - 12.9 FL    NEUTROPHILS 62 32 - 75 %    LYMPHOCYTES 28 12 - 49 %    MONOCYTES 8 5 - 13 %    EOSINOPHILS 1 0 - 7 %    BASOPHILS 1 0 - 1 %    IMMATURE GRANULOCYTES 0 0.0 - 0.5 %    ABS. NEUTROPHILS 3.7 1.8 - 8.0 K/UL    ABS. LYMPHOCYTES 1.7 0.8 - 3.5 K/UL    ABS. MONOCYTES 0.5 0.0 - 1.0 K/UL    ABS. EOSINOPHILS 0.1 0.0 - 0.4 K/UL    ABS. BASOPHILS 0.0 0.0 - 0.1 K/UL    ABS. IMM.  GRANS. 0.0 0.00 - 0.04 K/UL    DF AUTOMATED     METABOLIC PANEL, BASIC    Collection Time: 02/25/21  4:45 PM   Result Value Ref Range    Sodium 139 136 - 145 mmol/L    Potassium 3.1 (L) 3.5 - 5.1 mmol/L    Chloride 104 97 - 108 mmol/L    CO2 28 21 - 32 mmol/L    Anion gap 7 5 - 15 mmol/L    Glucose 102 (H) 65 - 100 mg/dL    BUN 21 (H) 6 - 20 mg/dL    Creatinine 0.82 0.55 - 1.02 mg/dL    BUN/Creatinine ratio 26 (H) 12 - 20      GFR est AA >60 >60 ml/min/1.73m2    GFR est non-AA >60 >60 ml/min/1.73m2    Calcium 8.4 (L) 8.5 - 10.1 mg/dL   TROPONIN I    Collection Time: 02/25/21  4:45 PM   Result Value Ref Range    Troponin-I, Qt. <0.05 <0.05 ng/mL   MAGNESIUM    Collection Time: 02/25/21  4:45 PM   Result Value Ref Range    Magnesium 1.4 (L) 1.6 - 2.4 mg/dL   GLUCOSE, POC    Collection Time: 02/25/21 10:07 PM   Result Value Ref Range    Glucose (POC) 95 65 - 100 mg/dL    Performed by JORGITO NULL    METABOLIC PANEL, BASIC    Collection Time: 02/26/21  1:26 AM   Result Value Ref Range    Sodium 138 136 - 145 mmol/L    Potassium 3.8 3.5 - 5.1 mmol/L    Chloride 102 97 - 108 mmol/L    CO2 34 (H) 21 - 32 mmol/L    Anion gap 2 (L) 5 - 15 mmol/L    Glucose 96 65 - 100 mg/dL    BUN 19 6 - 20 mg/dL    Creatinine 0.74 0.55 - 1.02 mg/dL    BUN/Creatinine ratio 26 (H) 12 - 20      GFR est AA >60 >60 ml/min/1.73m2    GFR est non-AA >60 >60 ml/min/1.73m2    Calcium 8.6 8.5 - 10.1 mg/dL   MAGNESIUM    Collection Time: 02/26/21  1:26 AM   Result Value Ref Range    Magnesium 1.5 (L) 1.6 - 2.4 mg/dL   CBC W/O DIFF    Collection Time: 02/26/21  1:26 AM   Result Value Ref Range    WBC 6.6 3.6 - 11.0 K/uL    RBC 4.27 3.80 - 5.20 M/uL    HGB 13.4 11.5 - 16.0 g/dL    HCT 41.0 35.0 - 47.0 %    MCV 96.0 80.0 - 99.0 FL    MCH 31.4 26.0 - 34.0 PG    MCHC 32.7 30.0 - 36.5 g/dL    RDW 15.1 (H) 11.5 - 14.5 %    PLATELET 903 434 - 862 K/uL    MPV 10.7 8.9 - 12.9 FL        Hamlet Howard MD

## 2021-02-26 NOTE — H&P
History and Physical    Patient: Jessica Beck MRN: [id number]  SSN: [id number]    YOB: 1950  Age: 65 y.o. Sex: female      Subjective:      Chief Complaint: Atrial fibrillation with rapid ventricular response. HPI: Jessica Beck is a 65 y.o. female with past medical history of atrial fibrillation currently on Xarelto, chronic pain currently on narcotics, non-insulin-dependent diabetes mellitus type 2 and hyperlipidemia presenting to the ER for treatment of atrial fibrillation with rapid ventricular response. Ms. Brian Sarmiento normally takes diltiazem for rate control. Patient has been out of her medications for the past 24 hours (currently in the mail). Today Ms. Rolle went to cardiologist office for recent increase in heart rate. Patient was found to be in atrial fibrillation with rapid ventricular response and was sent here to the ER for further treatment and evaluation. Ms. Brian Sarmiento denies chest pain, dizziness, syncope, nausea, shortness of breath or recent fever. Past medical history, past surgical history, family history, social history and home medication list was reviewed at the time of admission. , Migue Apple, can be reached at [phone]. Ms. Brian Sarmiento is a full code and uses a walker to assist with ambulation. Patient denies history of tobacco, alcohol or illicit drug use.     Past Medical History:   Diagnosis Date    [facility]-fib ([facility] [address] [facility]     Aneurysm ([facility] [address]      hx decending aortic aneurysm  states \"not there now\"  present on 2013 duplex imaging\"     Arthritis     Autoimmune disease ([facility] [address]     retroperitoneal fibrosis-  seen [facility] Rx    Chronic pain     sees pain management MD for chronic back pain Dr Z[facility])     Fatigue     Hypercholesterolemia     Ill-defined condition     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    Incontinence in female     Muscle weakness     Retroperitoneal fibrosis     aorta    Stroke ([facility] [address] 01/25/2016    only deficit is loss of right peripheral visit    Tinnitus      Past Surgical History:   Procedure Laterality Date    HX ADENOIDECTOMY      childhood    HX GYN  2010    tubes & ovaries removed    HX HEART CATHETERIZATION      X2- no blockages found    HX SHOULDER REPLACEMENT Left 2016    HX TONSILLECTOMY      childhood      Family History   Problem Relation Age of Onset    Dementia Mother     Heart Disease Mother     Coronary Artery Disease Mother         had open heart surgery    Diabetes Mother     Heart Disease Father     Heart Disease Maternal Grandmother     Heart Disease Maternal Grandfather     Heart Disease Paternal Grandmother     Heart Disease Paternal Grandfather     Heart Disease Brother      Social History     Tobacco Use    Smoking status: Never Smoker    Smokeless tobacco: Never Used   Substance Use Topics    Alcohol use: No      Prior to Admission medications    Medication Sig Start Date End Date Taking? Authorizing Provider   furosemide (LASIX) 20 mg tablet Take 20 mg by mouth daily. 2/15/21  Yes Provider, Historical   HYDROcodone-acetaminophen (NORCO) 5-325 mg per tablet Take 1 Tab by mouth every six (6) hours as needed for Moderate Pain. Yes Provider, Historical   rivaroxaban (XARELTO) 20 mg tab tablet Take 20 mg by mouth nightly. Yes Provider, Historical   ondansetron (ZOFRAN ODT) 4 mg disintegrating tablet Take 1 Tab by mouth every eight (8) hours as needed for Nausea. 6/10/17  Yes Leidy Torres PA-C   atorvastatin (LIPITOR) 40 mg tablet Take 40 mg by mouth daily. Yes Provider, Historical   diltiazem XR (DILACOR XR) 120 mg XR capsule Take  by mouth two (2) times a day. Yes Provider, Historical   fenofibrate micronized (LOFIBRA) 200 mg capsule Take  by mouth nightly. Yes Provider, Historical   metFORMIN (GLUCOPHAGE) 500 mg tablet Take 1,000 mg by mouth two (2) times daily (with meals).    Yes Provider, Historical   nitroglycerin (NITROSTAT) 0.4 mg SL tablet by SubLINGual route every five (5) minutes as needed for Chest Pain. Yes Provider, Historical   EPINEPHrine (EPIPEN) 0.3 mg/0.3 mL injection 0.3 mg by IntraMUSCular route once as needed. Provider, Historical   aspirin delayed-release 81 mg tablet Take 81 mg by mouth daily. Pt states she frequently does not take this medicine    Provider, Historical   coenzyme q10 10 mg cap Take 1 Cap by mouth daily. Provider, Historical   b complex vitamins tablet Take 1 Tab by mouth daily. Provider, Historical        Allergies   Allergen Reactions    Bee Sting [Sting, Bee] Anaphylaxis     Carries an epi pen    Ciprofloxacin Hives, Rash and Itching    Flagyl [Metronidazole] Rash and Nausea and Vomiting    Pcn [Penicillins] Rash     Pt states she can take keflex without problem       Review of Systems:  Constitutional: Denies fevers, chills, fatigue, weakness, unexplained weight loss, night sweats. Head, Eyes, Ears, Nose, Mouth, Throat: Denies nasal congestion, sore throat, rhinorrhea, earache, ringing of the ears, difficulty hearing, facial pain, facial swelling. Respiratory: Denies shortness of breath, wheezing, cough, sputum production, hemoptysis. Denies use of oxygen at home. Cardiovascular: Positive for elevated heart rate. No chest pain, lower extremity edema, dizziness, dyspnea on exertion, orthopnea. No lower extremity edema. Gastrointestinal: Denies nausea, vomiting, diarrhea, constipation, abdominal pain, loss of appetite, acid reflux, melena, hematochezia, change in bowel habits, jaundice. Endocrine: Denies intolerance to heat or cold. Denies polyuria, polydipsia, polyphagia. Denies recent weight changes. Genitourinary: Denies increased urinary frequency, dysuria, hematuria, urinary incontinence, increased urinary frequency. Integument/Breast: Denies rash, itching or new skin lesions. Musculoskeletal: Denies joint swelling, joint pain, myalgias, neck pain, back pain.   Neurological: Denies headaches, dizziness, confusion, tremors, numbness/tingling, paresthesias, weakness, problems with balance, loss of consciousness. Hematologic: Denies easy bleeding, easy bruising, lymphadenopathy. Behavioral/Psychiatric: Denies anxiety, depression, increased irritability, mood swings, delusions, hallucination, SI/HI. Objective:     Vitals:    02/25/21 1834 02/25/21 1839 02/25/21 2045 02/26/21 0209   BP: 123/80  119/80 125/75   Pulse: (!) 116 98 85 92   Resp: 18 18 18 18   Temp: 98.8 °F (37.1 °C)      SpO2: 97% 96% 98% 96%   Weight:       Height:            Physical Exam:  General: Alert and Oriented x 3. Cooperative and friendly. No acute distress. Nourished and well developed. Head/Eyes: Normocephalic, atraumatic, EOMI, PERRLA. Nose/Mouth: Turbinates within normal limits, No drainage. Mucous membranes are moist.  Throat and Neck: No masses, JVD, thyromegaly or lymphadenopathy appreciated. Cervical spine has good range of motion without pain. Lungs: Clear to auscultation bilaterally without wheezes, rhonchi or crackles. Good air movement bilaterally. Symmetric chest rise with respirations. Heart: Irregular irregular heart rhythm. No appreciated murmurs, rubs or gallops. No lower extremity edema. Abdomen: Soft, non-tender, non-distended. Bowel sounds present in all four quadrants. No masses or hepatosplenomegaly appreciated. Extremities:  Atraumatic. Able to move all extremities symmetrically. No abnormal bony protuberances appreciated. Joints within normal limits without swelling. Back: No pain with palpation over spinous processes or paraspinal musculature. No CVA tenderness. Skin: Clean, dry and intact without appreciated lesions. Neurologic: A&Ox3. Cranial nerves 2-12 are grossly intact. Intact sensation and motor strength in all 4 extremities. No focal deficits. Psychiatric: Normal affect, normal thought process, good eye contact.      Recent Results (from the past 24 hour(s))   CBC WITH AUTOMATED DIFF    Collection Time: 02/25/21 4:45 PM   Result Value Ref Range    WBC 6.0 3.6 - 11.0 K/uL    RBC 4.19 3.80 - 5.20 M/uL    HGB 12.9 11.5 - 16.0 g/dL    HCT 39.9 35.0 - 47.0 %    MCV 95.2 80.0 - 99.0 FL    MCH 30.8 26.0 - 34.0 PG    MCHC 32.3 30.0 - 36.5 g/dL    RDW 14.7 (H) 11.5 - 14.5 %    PLATELET 711 700 - 053 K/uL    MPV 10.4 8.9 - 12.9 FL    NEUTROPHILS 62 32 - 75 %    LYMPHOCYTES 28 12 - 49 %    MONOCYTES 8 5 - 13 %    EOSINOPHILS 1 0 - 7 %    BASOPHILS 1 0 - 1 %    IMMATURE GRANULOCYTES 0 0.0 - 0.5 %    ABS. NEUTROPHILS 3.7 1.8 - 8.0 K/UL    ABS. LYMPHOCYTES 1.7 0.8 - 3.5 K/UL    ABS. MONOCYTES 0.5 0.0 - 1.0 K/UL    ABS. EOSINOPHILS 0.1 0.0 - 0.4 K/UL    ABS. BASOPHILS 0.0 0.0 - 0.1 K/UL    ABS. IMM.  GRANS. 0.0 0.00 - 0.04 K/UL    DF AUTOMATED     METABOLIC PANEL, BASIC    Collection Time: 02/25/21  4:45 PM   Result Value Ref Range    Sodium 139 136 - 145 mmol/L    Potassium 3.1 (L) 3.5 - 5.1 mmol/L    Chloride 104 97 - 108 mmol/L    CO2 28 21 - 32 mmol/L    Anion gap 7 5 - 15 mmol/L    Glucose 102 (H) 65 - 100 mg/dL    BUN 21 (H) 6 - 20 mg/dL    Creatinine 0.82 0.55 - 1.02 mg/dL    BUN/Creatinine ratio 26 (H) 12 - 20      GFR est AA >60 >60 ml/min/1.73m2    GFR est non-AA >60 >60 ml/min/1.73m2    Calcium 8.4 (L) 8.5 - 10.1 mg/dL   TROPONIN I    Collection Time: 02/25/21  4:45 PM   Result Value Ref Range    Troponin-I, Qt. <0.05 <0.05 ng/mL   MAGNESIUM    Collection Time: 02/25/21  4:45 PM   Result Value Ref Range    Magnesium 1.4 (L) 1.6 - 2.4 mg/dL   GLUCOSE, POC    Collection Time: 02/25/21 10:07 PM   Result Value Ref Range    Glucose (POC) 95 65 - 100 mg/dL    Performed by [place]    METABOLIC PANEL, BASIC    Collection Time: 02/26/21  1:26 AM   Result Value Ref Range    Sodium 138 136 - 145 mmol/L    Potassium 3.8 3.5 - 5.1 mmol/L    Chloride 102 97 - 108 mmol/L    CO2 34 (H) 21 - 32 mmol/L    Anion gap 2 (L) 5 - 15 mmol/L    Glucose 96 65 - 100 mg/dL    BUN 19 6 - 20 mg/dL    Creatinine 0.74 0.55 - 1.02 mg/dL    BUN/Creatinine ratio 26 (H) 12 - 20      GFR est AA >60 >60 ml/min/1.73m2    GFR est non-AA >60 >60 ml/min/1.73m2    Calcium 8.6 8.5 - 10.1 mg/dL   MAGNESIUM    Collection Time: 02/26/21  1:26 AM   Result Value Ref Range    Magnesium 1.5 (L) 1.6 - 2.4 mg/dL   CBC W/O DIFF    Collection Time: 02/26/21  1:26 AM   Result Value Ref Range    WBC 6.6 3.6 - 11.0 K/uL    RBC 4.27 3.80 - 5.20 M/uL    HGB 13.4 11.5 - 16.0 g/dL    HCT 41.0 35.0 - 47.0 %    MCV 96.0 80.0 - 99.0 FL    MCH 31.4 26.0 - 34.0 PG    MCHC 32.7 30.0 - 36.5 g/dL    RDW 15.1 (H) 11.5 - 14.5 %    PLATELET 751 811 - 825 K/uL    MPV 10.7 8.9 - 12.9 FL       XR Results (maximum last 3): Results from [place] encounter on 02/25/21   XR CHEST SNGL V    Narrative Chest single view. Coarse reticular markings present through the lungs. No interstitial or alveolar  pulmonary edema. Borderline cardiac silhouette enlargement. Atherosclerotic  change thoracic aorta. No pneumothorax or sizable pleural effusion. Thoracic spondylosis. Bilateral shoulder hardware. Impression No plain film evidence for CHF or pneumonia. Results from [place] encounter on 02/09/21   XR PELV 1 OR 2 V    Narrative 2 view    Moderate narrowing of the medial joint spaces, symmetric, with some surrounding  bone hypertrophy. Superior hip joint spaces are maintained and therefore,  question underlying erosive predisposition. No fracture   XR SPINE LUMB 2 OR 3 V    Narrative 2 view    Advanced DDD at every level. Advanced diffuse facet DJD. No fracture or bone  destruction. Rotatory levoscoliosis       CT Results (maximum last 3): Results from [place] encounter on 05/23/17   CT UP EXT RT WO CONT    Narrative EXAM:  CT UP EXT RT WO CONT    INDICATION:  Right shoulder pain. Preoperative evaluation. COMPARISON: None    TECHNIQUE: Helical CT of the right shoulder with oblique coronal and oblique  sagittal reformats. Images reviewed in soft tissue and bone windows.   CT dose  reduction was achieved through the use of a standardized protocol tailored for  this examination and automatic exposure control for dose modulation. The [employer]  protocol was followed. CONTRAST: None. FINDINGS: Glenohumeral joint: Severe osteoarthritis. There is flattening of the  humeral head. Multiple small subchondral cysts are seen underneath the articular  surface. There is a prominent spur from the inferior humeral head. The glenoid  shows mild cupping with mild retroversion. The glenoid shows mild subchondral  sclerosis and multiple small subchondral cysts. Glenoid bone depth in the transverse plane: 2.7 cm. Bones: Normal bone mineralization. No fracture, dislocation, or periosteal  reaction. Joint fluid:  No significant joint effusion. Rotator cuff tendons: No massive cuff tear identified given the limitations of  CT. Acromioclavicular joint: Mild osteoarthritis. Several small chronic ossicles are  seen above the joint. Acromion process type: 2    Muscles: No atrophy. Other: No soft tissue mass. A small calcified granuloma is seen in the right  upper lobe. Partially imaged lung is otherwise clear. Impression IMPRESSION:   Severe glenohumeral osteoarthritis          Assessment:     Miranda Mccabe is a 65 y.o. female who presents in atrial fibrillation with rapid ventricular response. Ms. Chely Wen has been out of p.o. diltiazem over the past 24 hours. Patient was administered IV diltiazem and heart rate is currently in the 80s. Admit for atrial fibrillation with rapid ventricular response and planned cardioversion in the morning. Plan:     1. Admit to telemetry bed. 2. Order PO dose of diltiazem. Continue home dose of Xarelto. Cardiology consult. N.p.o. after midnight for planned cardioversion in the morning. 3. Check TSH. 4. For hypokalemia, potassium was replenished with p.o. supplements.   Continue to monitor electrolytes during hospitalization and replenish as needed. 5. For history of non-insulin-dependent diabetes mellitus type 2, check blood glucose every 6 hours. Order sensitive sliding scale insulin. 6. For history of hyperlipidemia, continue home dose of fenofibrate and Lipitor. GI PPX: IV famotidine. DVT PPX: Continue home dose of Xarelto.     Signed By: Lois Ty MD     February 26, 2021

## 2021-02-26 NOTE — ED NOTES
Pt AO4, no acute distress noted, ambulatory with no difficulty.  Pt verbalized understanding of dc instructions, FU care, medications

## 2021-02-26 NOTE — ED PROVIDER NOTES
EMERGENCY DEPARTMENT HISTORY AND PHYSICAL EXAM      Date: 2/25/2021  Patient Name: Beba Salter    History of Presenting Illness     Chief Complaint   Patient presents with    Irregular Heart Beat       History Provided By: Patient    HPI: Beba Salter, 65 y.o. female with PMHx as noted below presents the emergency department for evaluation of rapid A. fib. Patient otherwise denying any complaints. Patient states she had routine cardiology follow-up today and told she was in atrial fibrillation with rapid ventricular response was referred to the emergency department. Patient does note she had been out of her diltiazem for several days and took her last dose today. Pt denies any other alleviating or exacerbating factors. Additionally, pt specifically denies any recent fever, chills, headache, nausea, vomiting, abdominal pain, CP, SOB, lightheadedness, dizziness, numbness, weakness, BLE swelling, heart palpitations, urinary sxs, diarrhea, constipation, melena, hematochezia, cough, or congestion.   PCP: Carlos, MD Rand    Current Facility-Administered Medications   Medication Dose Route Frequency Provider Last Rate Last Admin    dilTIAZem (CARDIZEM) 125 mg in dextrose 5% 125 mL infusion  0-15 mg/hr IntraVENous TITRATE Romayne Holiday, MD   Stopped at 02/25/21 1945    sodium chloride (NS) flush 5-40 mL  5-40 mL IntraVENous Q8H Barbara Conde MD   10 mL at 02/25/21 2200    sodium chloride (NS) flush 5-40 mL  5-40 mL IntraVENous PRN Barbara Conde MD        acetaminophen (TYLENOL) tablet 650 mg  650 mg Oral Q6H PRN Barbara Conde MD        Or    acetaminophen (TYLENOL) suppository 650 mg  650 mg Rectal Q6H PRN Barbara Conde MD        polyethylene glycol (MIRALAX) packet 17 g  17 g Oral DAILY PRN Barbara Conde MD        promethazine (PHENERGAN) tablet 12.5 mg  12.5 mg Oral Q6H PRN Barbara Conde MD        Or    ondansetron [facility]) injection 4 mg  4 mg IntraVENous Q6H PRN Carin Richardson Harlie Sandifer, MD        glucose chewable tablet 16 g  4 Tab Oral PRN Wilberto Wu MD        dextrose (D50W) injection syrg 12.5-25 g  25-50 mL IntraVENous PRTISHA Wu MD        glucagon (GLUCAGEN) injection 1 mg  1 mg IntraMUSCular YAZMIN Wu MD        insulin lispro (HUMALOG) injection   SubCUTAneous AC&HS Wilberto Wu MD   Stopped at 02/25/21 2200     Current Outpatient Medications   Medication Sig Dispense Refill    furosemide (LASIX) 20 mg tablet Take 20 mg by mouth daily.  HYDROcodone-acetaminophen (NORCO) 5-325 mg per tablet Take 1 Tab by mouth every six (6) hours as needed for Moderate Pain.  rivaroxaban (XARELTO) 20 mg tab tablet Take 20 mg by mouth nightly.  ondansetron (ZOFRAN ODT) 4 mg disintegrating tablet Take 1 Tab by mouth every eight (8) hours as needed for Nausea. 30 Tab 0    atorvastatin (LIPITOR) 40 mg tablet Take 40 mg by mouth daily.  diltiazem XR (DILACOR XR) 120 mg XR capsule Take  by mouth two (2) times a day.  fenofibrate micronized (LOFIBRA) 200 mg capsule Take  by mouth nightly.  metFORMIN (GLUCOPHAGE) 500 mg tablet Take 1,000 mg by mouth two (2) times daily (with meals).  nitroglycerin (NITROSTAT) 0.4 mg SL tablet by SubLINGual route every five (5) minutes as needed for Chest Pain.  EPINEPHrine (EPIPEN) 0.3 mg/0.3 mL injection 0.3 mg by IntraMUSCular route once as needed.  aspirin delayed-release 81 mg tablet Take 81 mg by mouth daily. Pt states she frequently does not take this medicine      coenzyme q10 10 mg cap Take 1 Cap by mouth daily.  b complex vitamins tablet Take 1 Tab by mouth daily.          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    Incontinence in female     Muscle weakness     Retroperitoneal fibrosis     aorta    Stroke ([facility] [address] 01/25/2016    only deficit is loss of right peripheral visit    Tinnitus        Past Surgical History:  Past Surgical History:   Procedure Laterality Date    HX ADENOIDECTOMY      childhood    HX GYN  2010    tubes & ovaries removed    HX HEART CATHETERIZATION      X2- no blockages found    HX SHOULDER REPLACEMENT Left 2016    HX TONSILLECTOMY      childhood       Family History:  Family History   Problem Relation Age of Onset    Dementia Mother     Heart Disease Mother     Coronary Artery Disease Mother         had open heart surgery    Diabetes Mother     Heart Disease Father     Heart Disease Maternal Grandmother     Heart Disease Maternal Grandfather     Heart Disease Paternal Grandmother     Heart Disease Paternal Grandfather     Heart Disease Brother        Social History:  Social History     Tobacco Use    Smoking status: Never Smoker    Smokeless tobacco: Never Used   Substance Use Topics    Alcohol use: No    Drug use: No       Allergies: Allergies   Allergen Reactions    Bee Sting [Sting, Bee] Anaphylaxis     Carries an epi pen    Ciprofloxacin Hives, Rash and Itching    Flagyl [Metronidazole] Rash and Nausea and Vomiting    Pcn [Penicillins] Rash     Pt states she can take keflex without problem         Review of Systems   Review of Systems  Constitutional: Negative for fever, chills, and fatigue. HENT: Negative for congestion, sore throat, rhinorrhea, sneezing and neck stiffness   Eyes: Negative for discharge and redness.    Respiratory: Negative for  shortness of breath, wheezing   Cardiovascular: Negative for chest pain, palpitations   Gastrointestinal: Negative for nausea, vomiting, abdominal pain, constipation, diarrhea and blood in stool. Genitourinary: Negative for dysuria, urgency, frequency, hematuria, flank pain, decreased urine volume, discharge,   Musculoskeletal: Negative for myalgias or joint pain . Skin: Negative for rash or lesions . Neurological: Negative weakness, light-headedness, numbness and headaches. Physical Exam   Physical Exam    GENERAL: alert and oriented, no acute distress  EYES: PEERL, No injection, discharge or icterus. ENT: Mucous membranes pink and moist.  NECK: Supple  LUNGS: Airway patent. Non-labored respirations. Breath sounds clear with good air entry bilaterally. HEART: Tachycardic, irregularly irregular rhythm. . No peripheral edema  ABDOMEN: Non-distended and non-tender, without guarding or rebound. SKIN:  warm, dry  EXTREMITIES: Without swelling, tenderness or deformity, symmetric with normal ROM  NEUROLOGICAL: Alert, oriented      Diagnostic Study Results     Labs -     Recent Results (from the past 12 hour(s))   CBC WITH AUTOMATED DIFF    Collection Time: 02/25/21  4:45 PM   Result Value Ref Range    WBC 6.0 3.6 - 11.0 K/uL    RBC 4.19 3.80 - 5.20 M/uL    HGB 12.9 11.5 - 16.0 g/dL    HCT 39.9 35.0 - 47.0 %    MCV 95.2 80.0 - 99.0 FL    MCH 30.8 26.0 - 34.0 PG    MCHC 32.3 30.0 - 36.5 g/dL    RDW 14.7 (H) 11.5 - 14.5 %    PLATELET 982 755 - 045 K/uL    MPV 10.4 8.9 - 12.9 FL    NEUTROPHILS 62 32 - 75 %    LYMPHOCYTES 28 12 - 49 %    MONOCYTES 8 5 - 13 %    EOSINOPHILS 1 0 - 7 %    BASOPHILS 1 0 - 1 %    IMMATURE GRANULOCYTES 0 0.0 - 0.5 %    ABS. NEUTROPHILS 3.7 1.8 - 8.0 K/UL    ABS. LYMPHOCYTES 1.7 0.8 - 3.5 K/UL    ABS. MONOCYTES 0.5 0.0 - 1.0 K/UL    ABS. EOSINOPHILS 0.1 0.0 - 0.4 K/UL    ABS. BASOPHILS 0.0 0.0 - 0.1 K/UL    ABS. IMM.  GRANS. 0.0 0.00 - 0.04 K/UL    DF AUTOMATED     METABOLIC PANEL, BASIC    Collection Time: 02/25/21  4:45 PM   Result Value Ref Range    Sodium 139 136 - 145 mmol/L    Potassium 3.1 (L) 3.5 - 5.1 mmol/L    Chloride 104 97 - 108 mmol/L    CO2 28 21 - 32 mmol/L    Anion gap 7 5 - 15 mmol/L    Glucose 102 (H) 65 - 100 mg/dL    BUN 21 (H) 6 - 20 mg/dL    Creatinine 0.82 0.55 - 1.02 mg/dL    BUN/Creatinine ratio 26 (H) 12 - 20      GFR est AA >60 >60 ml/min/1.73m2    GFR est non-AA >60 >60 ml/min/1.73m2    Calcium 8.4 (L) 8.5 - 10.1 mg/dL   TROPONIN I    Collection Time: 02/25/21  4:45 PM   Result Value Ref Range    Troponin-I, Qt. <0.05 <0.05 ng/mL   MAGNESIUM    Collection Time: 02/25/21  4:45 PM   Result Value Ref Range    Magnesium 1.4 (L) 1.6 - 2.4 mg/dL   GLUCOSE, POC    Collection Time: 02/25/21 10:07 PM   Result Value Ref Range    Glucose (POC) 95 65 - 100 mg/dL    [place] [facility]        Radiologic Studies -   XR CHEST SNGL V   Final Result   No plain film evidence for CHF or pneumonia. CT Results  (Last 48 hours)    None        CXR Results  (Last 48 hours)               02/25/21 1657  XR CHEST SNGL V Final result    Impression:  No plain film evidence for CHF or pneumonia. Narrative:  Chest single view. Coarse reticular markings present through the lungs. No interstitial or alveolar   pulmonary edema. Borderline cardiac silhouette enlargement. Atherosclerotic   change thoracic aorta. No pneumothorax or sizable pleural effusion. Thoracic spondylosis. Bilateral shoulder hardware. Medical Decision Making     I, Genny El MD am the first provider for this patient and am the attending of record for this patient encounter. I reviewed the vital signs, available nursing notes, past medical history, past surgical history, family history and social history. Vital Signs-Reviewed the patient's vital signs.   Patient Vitals for the past 12 hrs:   Temp Pulse Resp BP SpO2   02/25/21 2045  85 18 119/80 98 %   02/25/21 1839  98 18  96 %   02/25/21 1834 98.8 °F (37.1 °C) (!) 116 18 123/80 97 %   02/25/21 1634 99 °F (37.2 °C) (!) 120 18 (!) 152/85 97 %         Records Reviewed: Nursing Notes and Old Medical Records    Provider Notes (Medical Decision Making):   64-year-old female presenting with atrial fibrillation with rapid ventricular response. Differential included medication noncompliance, dehydration, infection, electrolyte abnormalities. Labs were notable for hypokalemia and hypomagnesemia which were repleted. Patient was given a diltiazem with improvement in her rate. Discussed with cardiology Dr. Farideh Brenner who recommended admission for cardioversion in the morning. Recommended n.p.o. at midnight. ED Course:   Initial assessment performed. The patients presenting problems have been discussed, and they are in agreement with the care plan formulated and outlined with them. I have encouraged them to ask questions as they arise throughout their visit.         Medications   dilTIAZem (CARDIZEM) 125 mg in dextrose 5% 125 mL infusion (0 mg/hr IntraVENous Held 2/25/21 1945)   sodium chloride (NS) flush 5-40 mL (10 mL IntraVENous Given 2/25/21 2200)   sodium chloride (NS) flush 5-40 mL (has no administration in time range)   acetaminophen (TYLENOL) tablet 650 mg (has no administration in time range)     Or   acetaminophen (TYLENOL) suppository 650 mg (has no administration in time range)   polyethylene glycol (MIRALAX) packet 17 g (has no administration in time range)   promethazine (PHENERGAN) tablet 12.5 mg (has no administration in time range)     Or   ondansetron (ZOFRAN) injection 4 mg (has no administration in time range)   glucose chewable tablet 16 g (has no administration in time range)   dextrose (D50W) injection syrg 12.5-25 g (has no administration in time range)   glucagon (GLUCAGEN) injection 1 mg (has no administration in time range)   insulin lispro (HUMALOG) injection (0 Units SubCUTAneous Held 2/25/21 2200)   dilTIAZem (CARDIZEM) injection 15 mg (15 mg IntraVENous Given 2/25/21 1838)   potassium chloride SR (KLOR-CON 10) tablet 40 mEq (40 mEq Oral Given 2/25/21 2047)   magnesium sulfate 2 g/50 ml IVPB (premix or compounded) (2 g IntraVENous New Bag 2/25/21 1945)         PROGRESS:  The patient has been re-evaluated and sx have improved. Reviewed available results with patient and have counseled them on diagnosis and care plan. They have expressed understanding, and all their questions have been answered. They agree with plan for admission. CONSULT:  Yuri Ortega MD spoke with the hospitalist.  Discussed HPI and PE, available diagnostic tests and clinical findings. He is in agreement with care plans as outlined and will evaluate for admission       Admit Note  Patient is being admitted to the hospitalist.  The results of their tests and reasons for their admission have been discussed with them and/or available family. They convey agreement and understanding for the need to be admitted and for their admission diagnosis. Consultation has been made with the inpatient physician specialist for hospitalization. Critical Care Note     IMPENDING DETERIORATION -Cardiovascular  ASSOCIATED RISK FACTORS - Dysrhythmia  MANAGEMENT- Bedside Assessment and Supervision of Care  INTERPRETATION -  ECG and Blood Pressure  INTERVENTIONS -IV diltiazem electrolyte replacement  CASE REVIEW - Hospitalist/Intensivist  TREATMENT RESPONSE -Improved  PERFORMED BY - Self    NOTES   :  I have provided a total of 33 minutes of critical time not including time spent on separately documented procedures. The reason for providing this level of medical care for this critically ill patient was due to a critical illness that impaired one or more vital organ systems such that there was a high probability of imminent or life threatening deterioration in the patients condition.  This care involved high complexity decision making to assess, manipulate, and support vital system functions,  lab review, consultations with specialist, family decision- making, bedside attention and documentation. During this entire length of time I was immediately available to the patient  Disposition:  admission    PLAN:  1. Admit     Diagnosis     Clinical Impression:   1. Atrial fibrillation with rapid ventricular response (HCC)    2. Hypokalemia    3. Hypomagnesemia        Please note that this dictation was completed with Dragon, computer voice recognition software.  Quite often unanticipated grammatical, syntax, homophones, and other interpretive errors are inadvertently transcribed by the computer software.  Please disregard these errors.  Additionally, please excuse any errors that have escaped final proofreading.

## 2021-03-24 VITALS — HEIGHT: 64 IN | WEIGHT: 200 LBS | BODY MASS INDEX: 34.15 KG/M2

## 2021-03-24 NOTE — PERIOP NOTES
Patient stated during PA cardiology instructed for her to hold her metformin 2 days prior to procedure 4/20/21    1148 Called Dr. Perez Linear office, Spoke to Luis Angel Kahn re: Nasreen Polanco stated patient is to continue xarelto, patient is aware.

## 2021-04-12 ENCOUNTER — HOSPITAL ENCOUNTER (OUTPATIENT)
Dept: NON INVASIVE DIAGNOSTICS | Age: 71
Discharge: HOME | End: 2021-04-12
Attending: INTERNAL MEDICINE

## 2021-04-15 RX ORDER — SODIUM CHLORIDE 9 MG/ML
50 INJECTION, SOLUTION INTRAVENOUS CONTINUOUS
Status: CANCELLED | OUTPATIENT
Start: 2021-04-15

## 2021-04-20 ENCOUNTER — ANESTHESIA EVENT (OUTPATIENT)
Dept: NON INVASIVE DIAGNOSTICS | Age: 71
End: 2021-04-20
Payer: MEDICARE

## 2021-04-20 ENCOUNTER — HOSPITAL ENCOUNTER (OUTPATIENT)
Dept: NON INVASIVE DIAGNOSTICS | Age: 71
Discharge: HOME | End: 2021-04-20
Attending: INTERNAL MEDICINE | Admitting: INTERNAL MEDICINE
Payer: MEDICARE

## 2021-04-20 ENCOUNTER — ANESTHESIA (OUTPATIENT)
Dept: NON INVASIVE DIAGNOSTICS | Age: 71
End: 2021-04-20
Payer: MEDICARE

## 2021-04-20 ENCOUNTER — HOSPITAL ENCOUNTER (OUTPATIENT)
Dept: NON INVASIVE DIAGNOSTICS | Age: 71
Discharge: HOME | End: 2021-04-20
Attending: INTERNAL MEDICINE
Payer: MEDICARE

## 2021-04-20 VITALS
RESPIRATION RATE: 18 BRPM | WEIGHT: 190 LBS | HEART RATE: 80 BPM | TEMPERATURE: 97.9 F | OXYGEN SATURATION: 100 % | HEIGHT: 64 IN | SYSTOLIC BLOOD PRESSURE: 135 MMHG | DIASTOLIC BLOOD PRESSURE: 84 MMHG | BODY MASS INDEX: 32.44 KG/M2

## 2021-04-20 DIAGNOSIS — I48.91 ATRIAL FIBRILLATION, UNSPECIFIED TYPE (HCC): ICD-10-CM

## 2021-04-20 LAB
ALBUMIN SERPL-MCNC: 3.5 G/DL (ref 3.5–5)
ALBUMIN/GLOB SERPL: 0.9 {RATIO} (ref 1.1–2.2)
ALP SERPL-CCNC: 97 U/L (ref 45–117)
ALT SERPL-CCNC: 23 U/L (ref 12–78)
ANION GAP SERPL CALC-SCNC: 6 MMOL/L (ref 5–15)
AST SERPL W P-5'-P-CCNC: 20 U/L (ref 15–37)
BILIRUB SERPL-MCNC: 0.6 MG/DL (ref 0.2–1)
BUN SERPL-MCNC: 20 MG/DL (ref 6–20)
BUN/CREAT SERPL: 29 (ref 12–20)
CA-I BLD-MCNC: 9.3 MG/DL (ref 8.5–10.1)
CHLORIDE SERPL-SCNC: 104 MMOL/L (ref 97–108)
CO2 SERPL-SCNC: 27 MMOL/L (ref 21–32)
COVID-19 RAPID TEST, COVR: NOT DETECTED
CREAT SERPL-MCNC: 0.7 MG/DL (ref 0.55–1.02)
ERYTHROCYTE [DISTWIDTH] IN BLOOD BY AUTOMATED COUNT: 14.6 % (ref 11.5–14.5)
GLOBULIN SER CALC-MCNC: 3.7 G/DL (ref 2–4)
GLUCOSE BLD STRIP.AUTO-MCNC: 120 MG/DL (ref 65–100)
GLUCOSE SERPL-MCNC: 112 MG/DL (ref 65–100)
HCT VFR BLD AUTO: 40.4 % (ref 35–47)
HGB BLD-MCNC: 13.3 G/DL (ref 11.5–16)
MCH RBC QN AUTO: 31.4 PG (ref 26–34)
MCHC RBC AUTO-ENTMCNC: 32.9 G/DL (ref 30–36.5)
MCV RBC AUTO: 95.3 FL (ref 80–99)
PERFORMED BY, TECHID: ABNORMAL
PLATELET # BLD AUTO: 260 K/UL (ref 150–400)
PMV BLD AUTO: 10.4 FL (ref 8.9–12.9)
POTASSIUM SERPL-SCNC: 4.2 MMOL/L (ref 3.5–5.1)
POTASSIUM SERPL-SCNC: 4.2 MMOL/L (ref 3.5–5.1)
PROT SERPL-MCNC: 7.2 G/DL (ref 6.4–8.2)
RBC # BLD AUTO: 4.24 M/UL (ref 3.8–5.2)
SODIUM SERPL-SCNC: 137 MMOL/L (ref 136–145)
SPECIMEN SOURCE: NORMAL
WBC # BLD AUTO: 7.6 K/UL (ref 3.6–11)

## 2021-04-20 PROCEDURE — 74011000250 HC RX REV CODE- 250: Performed by: ANESTHESIOLOGY

## 2021-04-20 PROCEDURE — 74011250636 HC RX REV CODE- 250/636: Performed by: ANESTHESIOLOGY

## 2021-04-20 PROCEDURE — 74011250636 HC RX REV CODE- 250/636: Performed by: INTERNAL MEDICINE

## 2021-04-20 PROCEDURE — 87635 SARS-COV-2 COVID-19 AMP PRB: CPT

## 2021-04-20 PROCEDURE — 85027 COMPLETE CBC AUTOMATED: CPT

## 2021-04-20 PROCEDURE — 84132 ASSAY OF SERUM POTASSIUM: CPT

## 2021-04-20 PROCEDURE — 93005 ELECTROCARDIOGRAM TRACING: CPT

## 2021-04-20 PROCEDURE — 82962 GLUCOSE BLOOD TEST: CPT

## 2021-04-20 PROCEDURE — 93312 ECHO TRANSESOPHAGEAL: CPT

## 2021-04-20 RX ORDER — PROPOFOL 10 MG/ML
INJECTION, EMULSION INTRAVENOUS AS NEEDED
Status: DISCONTINUED | OUTPATIENT
Start: 2021-04-20 | End: 2021-04-20 | Stop reason: HOSPADM

## 2021-04-20 RX ORDER — ETOMIDATE 2 MG/ML
INJECTION INTRAVENOUS AS NEEDED
Status: DISCONTINUED | OUTPATIENT
Start: 2021-04-20 | End: 2021-04-20 | Stop reason: HOSPADM

## 2021-04-20 RX ORDER — SODIUM CHLORIDE, SODIUM LACTATE, POTASSIUM CHLORIDE, CALCIUM CHLORIDE 600; 310; 30; 20 MG/100ML; MG/100ML; MG/100ML; MG/100ML
INJECTION, SOLUTION INTRAVENOUS
Status: DISCONTINUED | OUTPATIENT
Start: 2021-04-20 | End: 2021-04-20 | Stop reason: HOSPADM

## 2021-04-20 RX ORDER — ETOMIDATE 2 MG/ML
INJECTION INTRAVENOUS
Status: COMPLETED
Start: 2021-04-20 | End: 2021-04-20

## 2021-04-20 RX ORDER — PHENYLEPHRINE HCL IN 0.9% NACL 1 MG/10 ML
SYRINGE (ML) INTRAVENOUS
Status: DISCONTINUED
Start: 2021-04-20 | End: 2021-04-21 | Stop reason: HOSPADM

## 2021-04-20 RX ORDER — SODIUM CHLORIDE 9 MG/ML
50 INJECTION, SOLUTION INTRAVENOUS CONTINUOUS
Status: DISCONTINUED | OUTPATIENT
Start: 2021-04-20 | End: 2021-04-29 | Stop reason: HOSPADM

## 2021-04-20 RX ORDER — ISOSORBIDE MONONITRATE 20 MG/1
20 TABLET ORAL 2 TIMES DAILY
COMMUNITY
End: 2021-04-27 | Stop reason: DRUGHIGH

## 2021-04-20 RX ORDER — LIDOCAINE HYDROCHLORIDE 20 MG/ML
INJECTION, SOLUTION INFILTRATION; PERINEURAL
Status: DISCONTINUED
Start: 2021-04-20 | End: 2021-04-21 | Stop reason: HOSPADM

## 2021-04-20 RX ORDER — LIDOCAINE HYDROCHLORIDE 20 MG/ML
INJECTION, SOLUTION EPIDURAL; INFILTRATION; INTRACAUDAL; PERINEURAL AS NEEDED
Status: DISCONTINUED | OUTPATIENT
Start: 2021-04-20 | End: 2021-04-20 | Stop reason: HOSPADM

## 2021-04-20 RX ORDER — PROPOFOL 10 MG/ML
INJECTION, EMULSION INTRAVENOUS
Status: COMPLETED
Start: 2021-04-20 | End: 2021-04-20

## 2021-04-20 RX ADMIN — PROPOFOL 50 MG: 10 INJECTION, EMULSION INTRAVENOUS at 13:57

## 2021-04-20 RX ADMIN — ETOMIDATE 4 MG: 2 INJECTION INTRAVENOUS at 13:59

## 2021-04-20 RX ADMIN — LIDOCAINE HYDROCHLORIDE 100 MG: 20 INJECTION, SOLUTION EPIDURAL; INFILTRATION; INTRACAUDAL; PERINEURAL at 13:52

## 2021-04-20 RX ADMIN — PROPOFOL 50 MG: 10 INJECTION, EMULSION INTRAVENOUS at 14:02

## 2021-04-20 RX ADMIN — SODIUM CHLORIDE, POTASSIUM CHLORIDE, SODIUM LACTATE AND CALCIUM CHLORIDE: 600; 310; 30; 20 INJECTION, SOLUTION INTRAVENOUS at 13:52

## 2021-04-20 RX ADMIN — PROPOFOL 50 MG: 10 INJECTION, EMULSION INTRAVENOUS at 14:08

## 2021-04-20 RX ADMIN — ETOMIDATE 4 MG: 2 INJECTION INTRAVENOUS at 13:52

## 2021-04-20 RX ADMIN — SODIUM CHLORIDE 50 ML/HR: 9 INJECTION, SOLUTION INTRAVENOUS at 11:12

## 2021-04-20 RX ADMIN — ETOMIDATE 4 MG: 2 INJECTION INTRAVENOUS at 14:07

## 2021-04-20 RX ADMIN — ETOMIDATE 4 MG: 2 INJECTION INTRAVENOUS at 13:55

## 2021-04-20 RX ADMIN — ETOMIDATE 4 MG: 2 INJECTION INTRAVENOUS at 14:03

## 2021-04-20 RX ADMIN — PROPOFOL 50 MG: 10 INJECTION, EMULSION INTRAVENOUS at 13:52

## 2021-04-20 NOTE — ANESTHESIA POSTPROCEDURE EVALUATION
* No procedures listed *.    total IV anesthesia, general    Anesthesia Post Evaluation      Multimodal analgesia: multimodal analgesia used between 6 hours prior to anesthesia start to PACU discharge  Patient location during evaluation: PACU  Patient participation: complete - patient participated  Level of consciousness: awake  Pain score: 0  Pain management: adequate  Airway patency: patent  Anesthetic complications: no  Cardiovascular status: acceptable  Respiratory status: acceptable  Hydration status: acceptable  Post anesthesia nausea and vomiting:  controlled  Final Post Anesthesia Temperature Assessment:  Normothermia (36.0-37.5 degrees C)      INITIAL Post-op Vital signs:   Vitals Value Taken Time   /78 04/20/21 1409   Temp 36.5 °C (97.7 °F) 04/20/21 1409   Pulse 88 04/20/21 1409   Resp 16 04/20/21 1409   SpO2 100 % 04/20/21 1409

## 2021-04-20 NOTE — PROGRESS NOTES
Patient given discharge education verbally, and gave verbal understanding. Patient IV out and no signs of infection. Patient taken to ride's car via wheelchair.

## 2021-04-20 NOTE — ANESTHESIA PREPROCEDURE EVALUATION
Relevant Problems   CARDIOVASCULAR   (+) A-fib (HCC)   (+) Atrial fibrillation (HCC)   (+) Atrial fibrillation with RVR (HCC)   (+) Chronic a-fib (HCC)      ENDOCRINE   (+) DM (diabetes mellitus) (HCC)       Anesthetic History   No history of anesthetic complications            Review of Systems / Medical History  Patient summary reviewed, nursing notes reviewed and pertinent labs reviewed    Pulmonary  Within defined limits                 Neuro/Psych       CVA       Cardiovascular            Dysrhythmias : atrial fibrillation  Hyperlipidemia         GI/Hepatic/Renal                Endo/Other    Diabetes: type 2    Obesity and arthritis     Other Findings   Comments: Infection:   MRSA  Allergies  Bee Sting (Sting, Bee),      Ciprofloxacin, Flagyl (Metronidazole),      Pcn (Penicillins)  Ht: 5' 4\" (1.626 m)  Weight: 86.2 kg (190 lb)  BMI: 32.61 kg/m²  CrCl:   76 mL/min  Procedure  ECHO EDY W POSSIBLE CARDIOVERSION  In Pre-Op, [facility] PACU [facility] ANESTHESIA [facility] SAME [address] NON-INVASIVE CARDIOLOGY [facility] EKG      Medical History  Arthritis  Hypercholesterolemia  Retroperitoneal fibrosis  Fatigue  Incontinence in female  Muscle weakness  Tinnitus  Diabetes (HCC)  A-fib (HCC)  Aneurysm (HCC)  Autoimmune disease (HCC)  Chronic pain  Ill-defined condition  Stroke ([facility] [address]    Anesthesia Related (6)  DM (diabetes mellitus) (HCC)  Chronic a-fib (HCC)  AAA (abdominal aortic aneurysm) (HCC)  Atrial fibrillation with RVR (HCC)  Atrial fibrillation (HCC)  A-fib (HCC)             Physical Exam    Airway  Mallampati: II  TM Distance: 4 - 6 cm  Neck ROM: normal range of motion   Mouth opening: Normal     Cardiovascular    Rhythm: regular  Rate: normal         Dental  No notable dental hx       Pulmonary  Breath sounds clear to auscultation               Abdominal  GI exam deferred       Other Findings   Comments: Results for Cait Lopez (MRN [id number]) as of 4/20/2021 12:23    4/20/2021 10:55  WBC: 7. 6  RBC: 4.24  HGB: 13.3  HCT: 40.4  MCV: 95.3  MCH: 31.4  MCHC: 32.9  RDW: 14.6 (H)  PLATELET: 570  MPV: 86.0    Results for Shen Hahn (MRN [id number]) as of 4/20/2021 12:23    4/20/2021 10:55  Sodium: 137  Potassium: 4.2  Chloride: 104  CO2: 27  Anion gap: 6  Glucose: 112 (H)  BUN: 20  Creatinine: 0.70  BUN/Creatinine ratio: 29 (H)  Calcium: 9.3  GFR est non-AA: >60  GFR est AA: >60  Bilirubin, total: 0.6  Protein, total: 7.2  Albumin: 3.5  Globulin: 3.7  A-G Ratio: 0.9 (L)  ALT: 23  AST: 20  Alk.  phosphatase: 97    Specimen source Nasopharyngeal      Final  COVID-19 rapid test Not Detected   Not Detected   Final             Anesthetic Plan    ASA: 3  Anesthesia type: total IV anesthesia and general - backup    Monitoring Plan: Continuous noninvasive hemodynamic monitoring      Induction: Intravenous  Anesthetic plan and risks discussed with: Patient and Family

## 2021-04-23 LAB
ATRIAL RATE: 375 BPM
ATRIAL RATE: 72 BPM
CALCULATED P AXIS, ECG09: 71 DEGREES
CALCULATED R AXIS, ECG10: 27 DEGREES
CALCULATED R AXIS, ECG10: 41 DEGREES
CALCULATED T AXIS, ECG11: 53 DEGREES
CALCULATED T AXIS, ECG11: 64 DEGREES
DIAGNOSIS, 93000: NORMAL
DIAGNOSIS, 93000: NORMAL
P-R INTERVAL, ECG05: 234 MS
Q-T INTERVAL, ECG07: 362 MS
Q-T INTERVAL, ECG07: 420 MS
QRS DURATION, ECG06: 80 MS
QRS DURATION, ECG06: 88 MS
QTC CALCULATION (BEZET), ECG08: 440 MS
QTC CALCULATION (BEZET), ECG08: 459 MS
VENTRICULAR RATE, ECG03: 72 BPM
VENTRICULAR RATE, ECG03: 89 BPM

## 2021-04-27 ENCOUNTER — HOSPITAL ENCOUNTER (OUTPATIENT)
Age: 71
Setting detail: OBSERVATION
Discharge: HOME | End: 2021-04-29
Attending: STUDENT IN AN ORGANIZED HEALTH CARE EDUCATION/TRAINING PROGRAM | Admitting: HOSPITALIST
Payer: MEDICARE

## 2021-04-27 ENCOUNTER — APPOINTMENT (OUTPATIENT)
Dept: GENERAL RADIOLOGY | Age: 71
End: 2021-04-27
Attending: STUDENT IN AN ORGANIZED HEALTH CARE EDUCATION/TRAINING PROGRAM
Payer: MEDICARE

## 2021-04-27 DIAGNOSIS — R07.9 CHEST PAIN, UNSPECIFIED TYPE: Primary | ICD-10-CM

## 2021-04-27 LAB
ALBUMIN SERPL-MCNC: 3.5 G/DL (ref 3.5–5)
ALBUMIN/GLOB SERPL: 0.8 {RATIO} (ref 1.1–2.2)
ALP SERPL-CCNC: 108 U/L (ref 45–117)
ALT SERPL-CCNC: 26 U/L (ref 12–78)
ANION GAP SERPL CALC-SCNC: 6 MMOL/L (ref 5–15)
APTT PPP: 30.7 SEC (ref 21.2–34.1)
AST SERPL W P-5'-P-CCNC: 60 U/L (ref 15–37)
BASOPHILS # BLD: 0 K/UL (ref 0–0.1)
BASOPHILS NFR BLD: 0 % (ref 0–1)
BILIRUB SERPL-MCNC: 0.4 MG/DL (ref 0.2–1)
BUN SERPL-MCNC: 25 MG/DL (ref 6–20)
BUN/CREAT SERPL: 19 (ref 12–20)
CA-I BLD-MCNC: 9.3 MG/DL (ref 8.5–10.1)
CHLORIDE SERPL-SCNC: 104 MMOL/L (ref 97–108)
CK MB CFR SERPL CALC: 1.8 %
CK MB SERPL-MCNC: 3.9 NG/ML (ref 5–25)
CK SERPL-CCNC: 222 NG/ML (ref 26–192)
CO2 SERPL-SCNC: 21 MMOL/L (ref 21–32)
CREAT SERPL-MCNC: 1.35 MG/DL (ref 0.55–1.02)
DIFFERENTIAL METHOD BLD: ABNORMAL
EOSINOPHIL # BLD: 0.1 K/UL (ref 0–0.4)
EOSINOPHIL NFR BLD: 1 % (ref 0–7)
ERYTHROCYTE [DISTWIDTH] IN BLOOD BY AUTOMATED COUNT: 14.7 % (ref 11.5–14.5)
GLOBULIN SER CALC-MCNC: 4.4 G/DL (ref 2–4)
GLUCOSE BLD STRIP.AUTO-MCNC: 164 MG/DL (ref 65–100)
GLUCOSE SERPL-MCNC: 97 MG/DL (ref 65–100)
HCT VFR BLD AUTO: 45.7 % (ref 35–47)
HGB BLD-MCNC: 15.4 G/DL (ref 11.5–16)
IMM GRANULOCYTES # BLD AUTO: 0 K/UL (ref 0–0.04)
IMM GRANULOCYTES NFR BLD AUTO: 0 % (ref 0–0.5)
INR PPP: 1.4 (ref 0.9–1.1)
LYMPHOCYTES # BLD: 2.2 K/UL (ref 0.8–3.5)
LYMPHOCYTES NFR BLD: 29 % (ref 12–49)
MCH RBC QN AUTO: 32 PG (ref 26–34)
MCHC RBC AUTO-ENTMCNC: 33.7 G/DL (ref 30–36.5)
MCV RBC AUTO: 94.8 FL (ref 80–99)
MONOCYTES # BLD: 0.8 K/UL (ref 0–1)
MONOCYTES NFR BLD: 10 % (ref 5–13)
NEUTS SEG # BLD: 4.7 K/UL (ref 1.8–8)
NEUTS SEG NFR BLD: 60 % (ref 32–75)
NRBC # BLD: 0 K/UL (ref 0–0.01)
NRBC BLD-RTO: 0 PER 100 WBC
PERFORMED BY, TECHID: ABNORMAL
PLATELET # BLD AUTO: 328 K/UL (ref 150–400)
PMV BLD AUTO: 10.4 FL (ref 8.9–12.9)
POTASSIUM SERPL-SCNC: 5.5 MMOL/L (ref 3.5–5.1)
PROT SERPL-MCNC: 7.9 G/DL (ref 6.4–8.2)
PROTHROMBIN TIME: 16.6 SEC (ref 11.9–14.7)
RBC # BLD AUTO: 4.82 M/UL (ref 3.8–5.2)
SODIUM SERPL-SCNC: 131 MMOL/L (ref 136–145)
THERAPEUTIC RANGE,PTTT: NORMAL SEC
TROPONIN I SERPL-MCNC: <0.05 NG/ML
WBC # BLD AUTO: 7.8 K/UL (ref 3.6–11)

## 2021-04-27 PROCEDURE — 99218 HC RM OBSERVATION: CPT

## 2021-04-27 PROCEDURE — 93005 ELECTROCARDIOGRAM TRACING: CPT

## 2021-04-27 PROCEDURE — 85025 COMPLETE CBC W/AUTO DIFF WBC: CPT

## 2021-04-27 PROCEDURE — 82553 CREATINE MB FRACTION: CPT

## 2021-04-27 PROCEDURE — 85610 PROTHROMBIN TIME: CPT

## 2021-04-27 PROCEDURE — 82550 ASSAY OF CK (CPK): CPT

## 2021-04-27 PROCEDURE — 74011250637 HC RX REV CODE- 250/637: Performed by: STUDENT IN AN ORGANIZED HEALTH CARE EDUCATION/TRAINING PROGRAM

## 2021-04-27 PROCEDURE — 82962 GLUCOSE BLOOD TEST: CPT

## 2021-04-27 PROCEDURE — 99284 EMERGENCY DEPT VISIT MOD MDM: CPT

## 2021-04-27 PROCEDURE — 80061 LIPID PANEL: CPT

## 2021-04-27 PROCEDURE — 74011250637 HC RX REV CODE- 250/637: Performed by: HOSPITALIST

## 2021-04-27 PROCEDURE — 84484 ASSAY OF TROPONIN QUANT: CPT

## 2021-04-27 PROCEDURE — 80053 COMPREHEN METABOLIC PANEL: CPT

## 2021-04-27 PROCEDURE — 71045 X-RAY EXAM CHEST 1 VIEW: CPT

## 2021-04-27 PROCEDURE — 85730 THROMBOPLASTIN TIME PARTIAL: CPT

## 2021-04-27 PROCEDURE — 36415 COLL VENOUS BLD VENIPUNCTURE: CPT

## 2021-04-27 RX ORDER — GUAIFENESIN 100 MG/5ML
325 LIQUID (ML) ORAL
Status: COMPLETED | OUTPATIENT
Start: 2021-04-27 | End: 2021-04-27

## 2021-04-27 RX ORDER — NITROGLYCERIN 0.4 MG/1
0.4 TABLET SUBLINGUAL
Status: COMPLETED | OUTPATIENT
Start: 2021-04-27 | End: 2021-04-27

## 2021-04-27 RX ORDER — DEXTROSE 50 % IN WATER (D50W) INTRAVENOUS SYRINGE
25-50 AS NEEDED
Status: DISCONTINUED | OUTPATIENT
Start: 2021-04-27 | End: 2021-04-29 | Stop reason: HOSPADM

## 2021-04-27 RX ORDER — LIDOCAINE 50 MG/G
1 PATCH TOPICAL DAILY
COMMUNITY
Start: 2021-02-27

## 2021-04-27 RX ORDER — SODIUM CHLORIDE 0.9 % (FLUSH) 0.9 %
5-40 SYRINGE (ML) INJECTION EVERY 8 HOURS
Status: DISCONTINUED | OUTPATIENT
Start: 2021-04-27 | End: 2021-04-29 | Stop reason: HOSPADM

## 2021-04-27 RX ORDER — ONDANSETRON 4 MG/1
4 TABLET, ORALLY DISINTEGRATING ORAL
Status: DISCONTINUED | OUTPATIENT
Start: 2021-04-27 | End: 2021-04-29 | Stop reason: HOSPADM

## 2021-04-27 RX ORDER — MAGNESIUM SULFATE 100 %
4 CRYSTALS MISCELLANEOUS AS NEEDED
Status: DISCONTINUED | OUTPATIENT
Start: 2021-04-27 | End: 2021-04-29 | Stop reason: HOSPADM

## 2021-04-27 RX ORDER — INSULIN LISPRO 100 [IU]/ML
INJECTION, SOLUTION INTRAVENOUS; SUBCUTANEOUS
Status: DISCONTINUED | OUTPATIENT
Start: 2021-04-27 | End: 2021-04-29 | Stop reason: HOSPADM

## 2021-04-27 RX ORDER — LEVOTHYROXINE SODIUM 25 UG/1
20 TABLET ORAL
COMMUNITY

## 2021-04-27 RX ORDER — OXYCODONE AND ACETAMINOPHEN 10; 325 MG/1; MG/1
1 TABLET ORAL
COMMUNITY
Start: 2021-04-08

## 2021-04-27 RX ORDER — ACETAMINOPHEN 325 MG/1
650 TABLET ORAL
Status: DISCONTINUED | OUTPATIENT
Start: 2021-04-27 | End: 2021-04-29 | Stop reason: HOSPADM

## 2021-04-27 RX ORDER — DILTIAZEM HYDROCHLORIDE 180 MG/1
180 CAPSULE, EXTENDED RELEASE ORAL DAILY
COMMUNITY
Start: 2021-04-22

## 2021-04-27 RX ORDER — ASPIRIN 81 MG/1
81 TABLET ORAL DAILY
Status: DISCONTINUED | OUTPATIENT
Start: 2021-04-28 | End: 2021-04-29 | Stop reason: HOSPADM

## 2021-04-27 RX ORDER — SODIUM CHLORIDE 0.9 % (FLUSH) 0.9 %
5-40 SYRINGE (ML) INJECTION AS NEEDED
Status: DISCONTINUED | OUTPATIENT
Start: 2021-04-27 | End: 2021-04-29 | Stop reason: HOSPADM

## 2021-04-27 RX ORDER — BISMUTH SUBSALICYLATE 262 MG
1 TABLET,CHEWABLE ORAL DAILY
COMMUNITY

## 2021-04-27 RX ORDER — NITROGLYCERIN 0.4 MG/1
0.4 TABLET SUBLINGUAL
Status: COMPLETED | OUTPATIENT
Start: 2021-04-27 | End: 2021-04-28

## 2021-04-27 RX ADMIN — NITROGLYCERIN 0.4 MG: 0.4 TABLET, ORALLY DISINTEGRATING SUBLINGUAL at 22:45

## 2021-04-27 RX ADMIN — ACETAMINOPHEN 650 MG: 325 TABLET, FILM COATED ORAL at 22:45

## 2021-04-27 RX ADMIN — NITROGLYCERIN 0.4 MG: 0.4 TABLET, ORALLY DISINTEGRATING SUBLINGUAL at 19:58

## 2021-04-27 RX ADMIN — Medication 10 ML: at 22:46

## 2021-04-27 RX ADMIN — NITROGLYCERIN 0.4 MG: 0.4 TABLET, ORALLY DISINTEGRATING SUBLINGUAL at 18:32

## 2021-04-27 RX ADMIN — ASPIRIN 81 MG CHEWABLE TABLET 324 MG: 81 TABLET CHEWABLE at 19:23

## 2021-04-27 NOTE — ED PROVIDER NOTES
EMERGENCY DEPARTMENT HISTORY AND PHYSICAL EXAM      Date: 4/27/2021  Patient Name: Parker Flores    History of Presenting Illness     Chief Complaint   Patient presents with    Chest Pain       History Provided By: Patient    HPI: Parker Flores, 66 y.o. female with a past medical history significant diabetes, hypertension, myocardial infarction and A. fib presents to the ED with cc of chest pain for 2 days. Patient describes pain as sharp midsternal with radiation to back, worse with any exertion with associated shortness of breath can walk about 2 feet before becoming short of breath. Patient has a history of CAD had stents placed approximately a year ago, patient was seen in 's clinic this a.m. for chest pain, was told to report to hospital for admission and cardiac catheterization tomorrow a.m. Patient denies any recent fevers chills, no abdominal pain nausea vomiting, no cough. There are no other complaints, changes, or physical findings at this time. PCP: Sharif Knott, DO    Current Outpatient Medications   Medication Sig Dispense Refill    isosorbide mononitrate (ISMO, MONOKET) 20 mg tablet Take 20 mg by mouth two (2) times a day.  dilTIAZem ER (DILACOR XR) 180 mg capsule Take 1 Cap by mouth daily. 30 Cap 1    furosemide (LASIX) 20 mg tablet Take 20 mg by mouth daily.  HYDROcodone-acetaminophen (NORCO) 5-325 mg per tablet Take 1 Tab by mouth every six (6) hours as needed for Moderate Pain.  rivaroxaban (XARELTO) 20 mg tab tablet Take 20 mg by mouth nightly.  ondansetron (ZOFRAN ODT) 4 mg disintegrating tablet Take 1 Tab by mouth every eight (8) hours as needed for Nausea. 30 Tab 0    atorvastatin (LIPITOR) 40 mg tablet Take 40 mg by mouth daily.  EPINEPHrine (EPIPEN) 0.3 mg/0.3 mL injection 0.3 mg by IntraMUSCular route once as needed.  aspirin delayed-release 81 mg tablet Take 81 mg by mouth daily.  Pt states she frequently does not take this medicine  coenzyme q10 10 mg cap Take 1 Cap by mouth daily.  b complex vitamins tablet Take 1 Tab by mouth daily.  fenofibrate micronized (LOFIBRA) 200 mg capsule Take  by mouth nightly.  metFORMIN (GLUCOPHAGE) 500 mg tablet Take 1,000 mg by mouth two (2) times daily (with meals).  nitroglycerin (NITROSTAT) 0.4 mg SL tablet by SubLINGual route every five (5) minutes as needed for Chest Pain.        Facility-Administered Medications Ordered in Other Encounters   Medication Dose Route Frequency Provider Last Rate Last Admin    0.9% sodium chloride infusion  50 mL/hr IntraVENous CONTINUOUS Kleber Levin MD 50 mL/hr at 04/20/21 1112 50 mL/hr at 04/20/21 1112       Past History     Past Medical History:  Past Medical History:   Diagnosis Date    A-fib ([address]     Aneurysm ([address]      hx decending aortic aneurysm  states \"not there now\"  present on 2013 duplex imaging\"     Arthritis     Autoimmune disease ([address]     retroperitoneal fibrosis-  seen [facility] for Rx    Chronic pain     sees pain management MD for chronic back pain Dr [facility])     Fatigue     Hypercholesterolemia     Ill-defined condition     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    Incontinence in female     Muscle weakness     Retroperitoneal fibrosis     aorta    Stroke ([address] 01/25/2016    x4  - only deficit is loss of right peripheral visit, emotional    Tinnitus        Past Surgical History:  Past Surgical History:   Procedure Laterality Date    HX ADENOIDECTOMY      childhood    HX GYN  2010    tubes & ovaries removed    HX HEART CATHETERIZATION      X2- no blockages found    HX SHOULDER REPLACEMENT Left 2016    bilateral    HX TONSILLECTOMY      childhood       Family History:  Family History   Problem Relation Age of Onset    Dementia Mother     Heart Disease Mother     Coronary Artery Disease Mother         had open heart surgery    Diabetes Mother     Heart Disease Father    [facility] Heart Disease Maternal Grandmother     Heart Disease Maternal Grandfather     Heart Disease Paternal Grandmother     Heart Disease Paternal Grandfather     Heart Disease Brother        Social History:  Social History     Tobacco Use    Smoking status: Never Smoker    Smokeless tobacco: Never Used   Substance Use Topics    Alcohol use: No    Drug use: No       Allergies: Allergies   Allergen Reactions    Bee Sting [Sting, Bee] Anaphylaxis     Carries an epi pen    Ciprofloxacin Hives, Rash and Itching    Flagyl [Metronidazole] Rash and Nausea and Vomiting    Pcn [Penicillins] Rash     Pt states she can take keflex without problem         Review of Systems     Review of Systems   Constitutional: Negative for activity change, chills and fever. HENT: Negative for congestion and sore throat. Eyes: Negative for photophobia and visual disturbance. Respiratory: Positive for chest tightness and shortness of breath. Negative for apnea. Cardiovascular: Positive for chest pain. Negative for palpitations and leg swelling. Gastrointestinal: Negative for abdominal pain, blood in stool, nausea and vomiting. Genitourinary: Negative for dysuria. Musculoskeletal: Negative for arthralgias and back pain. Skin: Negative for color change. Neurological: Negative for dizziness, weakness, numbness and headaches. Physical Exam     Physical Exam  Vitals signs and nursing note reviewed. Constitutional:       General: She is not in acute distress. Appearance: Normal appearance. She is normal weight. She is not ill-appearing. HENT:      Head: Normocephalic and atraumatic. Nose: Nose normal.      Mouth/Throat:      Mouth: Mucous membranes are moist.   Eyes:      Extraocular Movements: Extraocular movements intact. Pupils: Pupils are equal, round, and reactive to light. Neck:      Musculoskeletal: Normal range of motion and neck supple. No muscular tenderness.    Cardiovascular:      Rate and Rhythm: Normal rate and regular rhythm. Pulses: Normal pulses. Radial pulses are 2+ on the right side and 2+ on the left side. Heart sounds: Normal heart sounds. Pulmonary:      Effort: Pulmonary effort is normal.      Breath sounds: Normal breath sounds. Chest:      Chest wall: No tenderness or edema. Abdominal:      General: Abdomen is flat. Bowel sounds are normal.      Palpations: Abdomen is soft. Tenderness: There is no abdominal tenderness. There is no guarding. Musculoskeletal: Normal range of motion. General: No tenderness. Skin:     General: Skin is warm and dry. Neurological:      General: No focal deficit present. Mental Status: She is alert and oriented to person, place, and time. Cranial Nerves: No cranial nerve deficit. Sensory: No sensory deficit. Motor: No weakness. Psychiatric:         Mood and Affect: Mood is anxious. Diagnostic Study Results     Labs -     Recent Results (from the past 12 hour(s))   CBC WITH AUTOMATED DIFF    Collection Time: 04/27/21  6:43 PM   Result Value Ref Range    WBC 7.8 3.6 - 11.0 K/uL    RBC 4.82 3.80 - 5.20 M/uL    HGB 15.4 11.5 - 16.0 g/dL    HCT 45.7 35.0 - 47.0 %    MCV 94.8 80.0 - 99.0 FL    MCH 32.0 26.0 - 34.0 PG    MCHC 33.7 30.0 - 36.5 g/dL    RDW 14.7 (H) 11.5 - 14.5 %    PLATELET 684 072 - 079 K/uL    MPV 10.4 8.9 - 12.9 FL    NRBC 0.0 0  WBC    ABSOLUTE NRBC 0.00 0.00 - 0.01 K/uL    NEUTROPHILS 60 32 - 75 %    LYMPHOCYTES 29 12 - 49 %    MONOCYTES 10 5 - 13 %    EOSINOPHILS 1 0 - 7 %    BASOPHILS 0 0 - 1 %    IMMATURE GRANULOCYTES 0 0.0 - 0.5 %    ABS. NEUTROPHILS 4.7 1.8 - 8.0 K/UL    ABS. LYMPHOCYTES 2.2 0.8 - 3.5 K/UL    ABS. MONOCYTES 0.8 0.0 - 1.0 K/UL    ABS. EOSINOPHILS 0.1 0.0 - 0.4 K/UL    ABS. BASOPHILS 0.0 0.0 - 0.1 K/UL    ABS. IMM.  GRANS. 0.0 0.00 - 0.04 K/UL    DF AUTOMATED     METABOLIC PANEL, COMPREHENSIVE    Collection Time: 04/27/21  6:43 PM   Result Value Ref Range    Sodium 131 (L) 136 - 145 mmol/L    Potassium 5.5 (H) 3.5 - 5.1 mmol/L    Chloride 104 97 - 108 mmol/L    CO2 21 21 - 32 mmol/L    Anion gap 6 5 - 15 mmol/L    Glucose 97 65 - 100 mg/dL    BUN 25 (H) 6 - 20 mg/dL    Creatinine 1.35 (H) 0.55 - 1.02 mg/dL    BUN/Creatinine ratio 19 12 - 20      GFR est AA 47 (L) >60 ml/min/1.73m2    GFR est non-AA 38 (L) >60 ml/min/1.73m2    Calcium 9.3 8.5 - 10.1 mg/dL    Bilirubin, total 0.4 0.2 - 1.0 mg/dL    AST (SGOT) 60 (H) 15 - 37 U/L    ALT (SGPT) 26 12 - 78 U/L    Alk. phosphatase 108 45 - 117 U/L    Protein, total 7.9 6.4 - 8.2 g/dL    Albumin 3.5 3.5 - 5.0 g/dL    Globulin 4.4 (H) 2.0 - 4.0 g/dL    A-G Ratio 0.8 (L) 1.1 - 2.2     CK W/ REFLX CKMB    Collection Time: 04/27/21  6:43 PM   Result Value Ref Range    .0 (H) 26 - 192 ng/mL   TROPONIN I    Collection Time: 04/27/21  6:43 PM   Result Value Ref Range    Troponin-I, Qt. <0.05 <0.05 ng/mL   PROTHROMBIN TIME + INR    Collection Time: 04/27/21  7:00 PM   Result Value Ref Range    Prothrombin time 16.6 (H) 11.9 - 14.7 sec    INR 1.4 (H) 0.9 - 1.1     PTT    Collection Time: 04/27/21  7:00 PM   Result Value Ref Range    aPTT 30.7 21.2 - 34.1 sec    aPTT, therapeutic range   sec       Radiologic Studies -   [unfilled]  CT Results  (Last 48 hours)    None        CXR Results  (Last 48 hours)               04/27/21 1645  XR CHEST PORT Final result    Impression:  No significant change. Narrative:  Frontal radiograph of the chest 4:48 PM compared to 3/25 2021. INDICATION: Chest pain. Heart size is stable with a tortuous atherosclerotic aorta. Stable scoliosis of   the thoracolumbar spine. Bilateral shoulder replacements are noted. No   infiltrates or effusions. There is minimal atelectasis at the left lung base. No   pneumothorax. Medical Decision Making and ED Course   I am the first provider for this patient.     I reviewed the vital signs, available nursing notes, past medical history, past surgical history, family history and social history. Vital Signs-Reviewed the patient's vital signs. Patient Vitals for the past 12 hrs:   Temp Pulse Resp BP SpO2   04/27/21 1855 98.2 °F (36.8 °C) 80 20 120/82 98 %   04/27/21 1629 98.4 °F (36.9 °C) 84 20 122/72 97 %       EKG interpretation: (Preliminary)  A. fib 102, no ST elevations, normal axis    Records Reviewed: Nursing Notes and Old Medical Records    The patient presents with chest pain with a differential diagnosis of  abnormal EKG, ACS, arrhythmia, acute MI, pulmonary edema/CHF and angina      Provider Notes (Medical Decision Making):     MDM   77-year-old female, past medical history of hypertension, diabetes, CAD status post stents, presents to emergency department from cardiology clinic for chest pain ongoing for 2 days. Chest pain midsternal rating to left and back worse with any exertion with associated shortness of breath. Patient reports pain was relieved with nitro at home, currently complaining of some mild chest pain. physical exam shows well-appearing female, slightly anxious, but in no distress, stable vitals, normal cardiovascular exam.    EKG shows no acute MI, will draw basic lab work including cardiac enzymes, admit patient to hospital service for cardiac cath tomorrow. ED Course:   Initial assessment performed. The patients presenting problems have been discussed, and they are in agreement with the care plan formulated and outlined with them. I have encouraged them to ask questions as they arise throughout their visit. ED Course as of Apr 27 1935   Tue Apr 27, 2021 1925 Lab work resulted, troponin negative x1, chest x-ray shows no acute infiltrate, patient states she used to see Dr. Desire Gamboa, has been followed up with Dr. Jemma Foote. Will page hospitalist group to admit patient for chest pain. Aspirin 324mg ordered for patient.     [PZ]   1932 Discussed case with Dr. Tripp Flannery, will admit for chest pain.      [PZ]      ED Course User Index  [PZ] Selina Grijalva MD         Procedures       Gi Mccurdy MD  Procedures               Disposition       Admitted      Diagnosis     Clinical Impression:   1. Chest pain, unspecified type        Attestations:    Gi Mccurdy MD    Please note that this dictation was completed with [employer], the computer voice recognition software. Quite often unanticipated grammatical, syntax, homophones, and other interpretive errors are inadvertently transcribed by the computer software. Please disregard these errors. Please excuse any errors that have escaped final proofreading. Thank you.

## 2021-04-27 NOTE — ED TRIAGE NOTES
Pt c/o chest pain x2 days. Seen in Dr. Eulogio Delgadillo office today and sent here for admission under Dr. Maria E Piña for Catheterization tomorrow.  Pt is on 20mg of Xarelto which she is to hold per Cardiologist.

## 2021-04-28 ENCOUNTER — APPOINTMENT (OUTPATIENT)
Dept: NUCLEAR MEDICINE | Age: 71
End: 2021-04-28
Attending: INTERNAL MEDICINE
Payer: MEDICARE

## 2021-04-28 ENCOUNTER — APPOINTMENT (OUTPATIENT)
Dept: NON INVASIVE DIAGNOSTICS | Age: 71
End: 2021-04-28
Attending: INTERNAL MEDICINE
Payer: MEDICARE

## 2021-04-28 ENCOUNTER — APPOINTMENT (OUTPATIENT)
Dept: ULTRASOUND IMAGING | Age: 71
End: 2021-04-28
Attending: INTERNAL MEDICINE
Payer: MEDICARE

## 2021-04-28 LAB
ANION GAP SERPL CALC-SCNC: 8 MMOL/L (ref 5–15)
BUN SERPL-MCNC: 20 MG/DL (ref 6–20)
BUN/CREAT SERPL: 25 (ref 12–20)
CA-I BLD-MCNC: 9.6 MG/DL (ref 8.5–10.1)
CHLORIDE SERPL-SCNC: 105 MMOL/L (ref 97–108)
CHOLEST SERPL-MCNC: 186 MG/DL
CO2 SERPL-SCNC: 24 MMOL/L (ref 21–32)
CREAT SERPL-MCNC: 0.79 MG/DL (ref 0.55–1.02)
ERYTHROCYTE [DISTWIDTH] IN BLOOD BY AUTOMATED COUNT: 14.5 % (ref 11.5–14.5)
ERYTHROCYTE [SEDIMENTATION RATE] IN BLOOD: 12 MM/HR
GLUCOSE BLD STRIP.AUTO-MCNC: 108 MG/DL (ref 65–100)
GLUCOSE BLD STRIP.AUTO-MCNC: 118 MG/DL (ref 65–100)
GLUCOSE BLD STRIP.AUTO-MCNC: 97 MG/DL (ref 65–100)
GLUCOSE SERPL-MCNC: 119 MG/DL (ref 65–100)
HCT VFR BLD AUTO: 46.2 % (ref 35–47)
HDLC SERPL-MCNC: 54 MG/DL
HDLC SERPL: 3.4 {RATIO} (ref 0–5)
HGB BLD-MCNC: 15.7 G/DL (ref 11.5–16)
LDLC SERPL CALC-MCNC: 106.8 MG/DL (ref 0–100)
LIPID PROFILE,FLP: ABNORMAL
MAGNESIUM SERPL-MCNC: 1.7 MG/DL (ref 1.6–2.4)
MCH RBC QN AUTO: 32.4 PG (ref 26–34)
MCHC RBC AUTO-ENTMCNC: 34 G/DL (ref 30–36.5)
MCV RBC AUTO: 95.3 FL (ref 80–99)
PERFORMED BY, TECHID: ABNORMAL
PERFORMED BY, TECHID: ABNORMAL
PERFORMED BY, TECHID: NORMAL
PLATELET # BLD AUTO: 337 K/UL (ref 150–400)
PMV BLD AUTO: 10.4 FL (ref 8.9–12.9)
POTASSIUM SERPL-SCNC: 3.8 MMOL/L (ref 3.5–5.1)
RBC # BLD AUTO: 4.85 M/UL (ref 3.8–5.2)
SODIUM SERPL-SCNC: 137 MMOL/L (ref 136–145)
STRESS BASELINE DIAS BP: 94 MMHG
STRESS BASELINE HR: 85 BPM
STRESS BASELINE SYS BP: 137 MMHG
STRESS PERCENT HR ACHIEVED: 85 %
STRESS POST PEAK HR: 125 BPM
STRESS STAGE 1 DURATION: NORMAL MIN:SEC
STRESS STAGE 1 HR: 111 BPM
STRESS STAGE 2 BP: NORMAL MMHG
STRESS STAGE 2 DURATION: NORMAL MIN:SEC
STRESS STAGE 2 HR: 109 BPM
STRESS STAGE 3 BP: NORMAL MMHG
STRESS STAGE 3 DURATION: NORMAL MIN:SEC
STRESS STAGE 3 HR: 111 BPM
STRESS STAGE 4 BP: NORMAL MMHG
STRESS STAGE 4 DURATION: NORMAL MIN:SEC
STRESS STAGE 4 HR: 110 BPM
STRESS TARGET HR: 147 BPM
TRIGL SERPL-MCNC: 126 MG/DL (ref ?–150)
TROPONIN I SERPL-MCNC: <0.05 NG/ML
TROPONIN I SERPL-MCNC: <0.05 NG/ML
TSH SERPL DL<=0.05 MIU/L-ACNC: 0.89 UIU/ML (ref 0.36–3.74)
VLDLC SERPL CALC-MCNC: 25.2 MG/DL
WBC # BLD AUTO: 7 K/UL (ref 3.6–11)

## 2021-04-28 PROCEDURE — 99218 HC RM OBSERVATION: CPT

## 2021-04-28 PROCEDURE — 74011250637 HC RX REV CODE- 250/637: Performed by: STUDENT IN AN ORGANIZED HEALTH CARE EDUCATION/TRAINING PROGRAM

## 2021-04-28 PROCEDURE — 36415 COLL VENOUS BLD VENIPUNCTURE: CPT

## 2021-04-28 PROCEDURE — 84484 ASSAY OF TROPONIN QUANT: CPT

## 2021-04-28 PROCEDURE — 85652 RBC SED RATE AUTOMATED: CPT

## 2021-04-28 PROCEDURE — 82962 GLUCOSE BLOOD TEST: CPT

## 2021-04-28 PROCEDURE — 83735 ASSAY OF MAGNESIUM: CPT

## 2021-04-28 PROCEDURE — 76775 US EXAM ABDO BACK WALL LIM: CPT

## 2021-04-28 PROCEDURE — 80048 BASIC METABOLIC PNL TOTAL CA: CPT

## 2021-04-28 PROCEDURE — 93017 CV STRESS TEST TRACING ONLY: CPT

## 2021-04-28 PROCEDURE — 85027 COMPLETE CBC AUTOMATED: CPT

## 2021-04-28 PROCEDURE — 93005 ELECTROCARDIOGRAM TRACING: CPT

## 2021-04-28 PROCEDURE — 74011250637 HC RX REV CODE- 250/637: Performed by: HOSPITALIST

## 2021-04-28 PROCEDURE — 74011000250 HC RX REV CODE- 250: Performed by: HOSPITALIST

## 2021-04-28 PROCEDURE — 74011250636 HC RX REV CODE- 250/636

## 2021-04-28 PROCEDURE — 84443 ASSAY THYROID STIM HORMONE: CPT

## 2021-04-28 RX ORDER — LIDOCAINE 4 G/100G
1 PATCH TOPICAL EVERY 24 HOURS
Status: DISCONTINUED | OUTPATIENT
Start: 2021-04-28 | End: 2021-04-29 | Stop reason: HOSPADM

## 2021-04-28 RX ORDER — OXYCODONE AND ACETAMINOPHEN 5; 325 MG/1; MG/1
1 TABLET ORAL
Status: DISCONTINUED | OUTPATIENT
Start: 2021-04-28 | End: 2021-04-29

## 2021-04-28 RX ORDER — VITAMIN B COMPLEX
1 CAPSULE ORAL DAILY
Status: DISCONTINUED | OUTPATIENT
Start: 2021-04-28 | End: 2021-04-29 | Stop reason: HOSPADM

## 2021-04-28 RX ORDER — METFORMIN HYDROCHLORIDE 500 MG/1
1000 TABLET ORAL 2 TIMES DAILY WITH MEALS
Status: DISCONTINUED | OUTPATIENT
Start: 2021-04-28 | End: 2021-04-29 | Stop reason: HOSPADM

## 2021-04-28 RX ORDER — AMINOPHYLLINE 25 MG/ML
INJECTION, SOLUTION INTRAVENOUS
Status: COMPLETED
Start: 2021-04-28 | End: 2021-04-28

## 2021-04-28 RX ORDER — BISMUTH SUBSALICYLATE 262 MG
1 TABLET,CHEWABLE ORAL DAILY
Status: DISCONTINUED | OUTPATIENT
Start: 2021-04-28 | End: 2021-04-29 | Stop reason: HOSPADM

## 2021-04-28 RX ORDER — FENOFIBRATE 145 MG/1
145 TABLET, COATED ORAL DAILY
Status: DISCONTINUED | OUTPATIENT
Start: 2021-04-28 | End: 2021-04-29 | Stop reason: HOSPADM

## 2021-04-28 RX ORDER — ASPIRIN 81 MG/1
81 TABLET ORAL DAILY
Status: DISCONTINUED | OUTPATIENT
Start: 2021-04-28 | End: 2021-04-28

## 2021-04-28 RX ORDER — LEVOTHYROXINE SODIUM 25 UG/1
25 TABLET ORAL
Status: DISCONTINUED | OUTPATIENT
Start: 2021-04-28 | End: 2021-04-29 | Stop reason: HOSPADM

## 2021-04-28 RX ORDER — ATORVASTATIN CALCIUM 40 MG/1
40 TABLET, FILM COATED ORAL DAILY
Status: DISCONTINUED | OUTPATIENT
Start: 2021-04-28 | End: 2021-04-29 | Stop reason: HOSPADM

## 2021-04-28 RX ORDER — DILTIAZEM HYDROCHLORIDE 180 MG/1
180 CAPSULE, EXTENDED RELEASE ORAL DAILY
Status: DISCONTINUED | OUTPATIENT
Start: 2021-04-28 | End: 2021-04-29 | Stop reason: HOSPADM

## 2021-04-28 RX ADMIN — LEVOTHYROXINE SODIUM 25 MCG: 0.03 TABLET ORAL at 09:19

## 2021-04-28 RX ADMIN — Medication 10 ML: at 05:07

## 2021-04-28 RX ADMIN — NITROGLYCERIN 0.4 MG: 0.4 TABLET, ORALLY DISINTEGRATING SUBLINGUAL at 01:27

## 2021-04-28 RX ADMIN — MULTIVITAMIN TABLET 1 TABLET: TABLET at 09:19

## 2021-04-28 RX ADMIN — Medication 10 ML: at 21:14

## 2021-04-28 RX ADMIN — OXYCODONE AND ACETAMINOPHEN 1 TABLET: 5; 325 TABLET ORAL at 21:50

## 2021-04-28 RX ADMIN — Medication 10 ML: at 14:05

## 2021-04-28 RX ADMIN — ONDANSETRON 4 MG: 4 TABLET, ORALLY DISINTEGRATING ORAL at 01:14

## 2021-04-28 RX ADMIN — ATORVASTATIN CALCIUM 40 MG: 40 TABLET, FILM COATED ORAL at 09:19

## 2021-04-28 RX ADMIN — ASPIRIN 81 MG: 81 TABLET, COATED ORAL at 09:19

## 2021-04-28 RX ADMIN — Medication 1 CAPSULE: at 09:25

## 2021-04-28 RX ADMIN — FENOFIBRATE 145 MG: 145 TABLET, FILM COATED ORAL at 15:12

## 2021-04-28 RX ADMIN — NITROGLYCERIN 0.4 MG: 0.4 TABLET, ORALLY DISINTEGRATING SUBLINGUAL at 08:08

## 2021-04-28 RX ADMIN — AMINOPHYLLINE 125 MG: 25 INJECTION, SOLUTION INTRAVENOUS at 11:45

## 2021-04-28 RX ADMIN — REGADENOSON 0.4 MG: 0.08 INJECTION, SOLUTION INTRAVENOUS at 11:42

## 2021-04-28 RX ADMIN — OXYCODONE AND ACETAMINOPHEN 1 TABLET: 5; 325 TABLET ORAL at 15:12

## 2021-04-28 RX ADMIN — DILTIAZEM HYDROCHLORIDE 180 MG: 180 CAPSULE, EXTENDED RELEASE ORAL at 09:19

## 2021-04-28 NOTE — H&P
History and Physical              Subjective :   Chief Complaint : Chest pain    Source of information : Patient, old medical records and ED provider    History of present illness:   66 y.o. female with a history of coronary artery disease confirmed with cardiac catheterization 50% blockage few years ago, hypertension, diabetes mellitus and history of CVA presents to the emergency room complaining of chest discomfort and pain for 2 days. It is constantly there, worse with activity. No palpitations, no orthopnea or paroxysmal nocturnal dyspnea. Denies any fever, chills, cough or trouble breathing.   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    Incontinence in female     Muscle weakness     Retroperitoneal fibrosis     aorta    Stroke ([facility] 01/25/2016    x4  - only deficit is loss of right peripheral visit, emotional    Tinnitus      Past Surgical History:   Procedure Laterality Date    HX ADENOIDECTOMY      childhood    HX GYN  2010    tubes & ovaries removed    HX HEART CATHETERIZATION      X2- no blockages found    HX SHOULDER REPLACEMENT Left 2016    bilateral    HX TONSILLECTOMY      childhood     Family History   Problem Relation Age of Onset    Dementia Mother     Heart Disease Mother     Coronary Artery Disease Mother         had open heart surgery    Diabetes Mother     Heart Disease Father     Heart Disease Maternal Grandmother     Heart Disease Maternal Grandfather     Heart Disease Paternal Grandmother     Heart Disease Paternal Grandfather     Heart Disease Brother       Social History     Tobacco Use    Smoking status: Never Smoker    Smokeless tobacco: Never Used   Substance Use Topics    Alcohol use: No       Prior to Admission medications    Medication Sig Start Date End Date Taking? Authorizing Provider   lidocaine (LIDODERM) 5 % 1 Patch by TransDERmal route daily. 2/27/21  Yes Provider, Historical   oxyCODONE-acetaminophen (PERCOCET 10)  mg per tablet Take 1 Tab by mouth every six (6) hours as needed for Severe Pain. 4/8/21  Yes Provider, Historical   dilTIAZem ER (TIAZAC) 180 mg capsule Take 180 mg by mouth daily. 4/22/21  Yes Provider, Historical   multivitamin (ONE A DAY) tablet Take 1 Tab by mouth daily. Yes Provider, Historical   levothyroxine (SYNTHROID) 25 mcg tablet Take 20 mcg by mouth Daily (before breakfast). Yes Provider, Historical   furosemide (LASIX) 20 mg tablet Take 20 mg by mouth daily. 2/15/21  Yes Provider, Historical   rivaroxaban (XARELTO) 20 mg tab tablet Take 20 mg by mouth nightly. Yes Provider, Historical   atorvastatin (LIPITOR) 40 mg tablet Take 40 mg by mouth daily. Yes Provider, Historical   aspirin delayed-release 81 mg tablet Take 81 mg by mouth daily. Pt states she frequently does not take this medicine   Yes Provider, Historical   coenzyme q10 10 mg cap Take 1 Cap by mouth daily. Yes Provider, Historical   b complex vitamins tablet Take 1 Tab by mouth daily. Yes Provider, Historical   fenofibrate micronized (LOFIBRA) 200 mg capsule Take  by mouth nightly. Yes Provider, Historical   metFORMIN (GLUCOPHAGE) 500 mg tablet Take 1,000 mg by mouth two (2) times daily (with meals). Yes Provider, Historical   nitroglycerin (NITROSTAT) 0.4 mg SL tablet by SubLINGual route every five (5) minutes as needed for Chest Pain. Yes Provider, Historical   EPINEPHrine (EPIPEN) 0.3 mg/0.3 mL injection 0.3 mg by IntraMUSCular route once as needed.     Provider, Historical     Allergies   Allergen Reactions    Bee Sting [Sting, Bee] Anaphylaxis     Carries an epi pen    Ciprofloxacin Hives, Rash and Itching    Flagyl [Metronidazole] Rash and Nausea and Vomiting    Pcn [Penicillins] Rash     Pt states she can take keflex without problem             Review of Systems:  Constitutional: Appetite is fair. Denies weight loss, no fever, no chills, no night sweats. Eye: No recent visual disturbances, no discharge, no double vision. Ear/nose/mouth/throat : No hearing disturbance, no ear pain, no nasal congestion, no sore throat, no trouble swallowing. Respiratory : No trouble breathing, no cough, ++ shortness of breath with activity, no hemoptysis, no wheezing. Cardiovascular : No chest pain, no palpitation, no orthopnea,  no peripheral edema. Gastrointestinal : No nausea, no vomiting, +++ constipation,  No abdominal pain. Genitourinary : No dysuria, no hematuria, no increased frequency, No incontinence. Lymphatics : No swollen glands -Neck, axillary, inguinal.  Endocrine : No excessive thirst, No polyuria. .  Immunologic : No hives, urticaria, No seasonal allergies. Musculoskeletal : No joint swelling, No pain, No effusion,  No back pain, No neck pain. Integumentary : No rash, No pruritus, No ecchymosis. Hematology : No petechiae, No easy bruising,  No tendency to bleed easy. Neurology : Denies change in mental status, No abnormal balance, no neurological deficits. Psychiatric : No mood swings, No anxiety, No depression. Vitals:     Patient Vitals for the past 12 hrs:   Temp Pulse Resp BP SpO2   04/28/21 0127  84  117/80    04/27/21 2216 98.2 °F (36.8 °C) 95 18 123/81 98 %   04/27/21 1855 98.2 °F (36.8 °C) 80 20 120/82 98 %   04/27/21 1629 98.4 °F (36.9 °C) 84 20 122/72 97 %       Physical Exam:   General : Looks comfortable, no respiratory distress noted. HEENT : PERRLA, normal oral mucosa, atraumatic normocephalic, Normal ear and nose. Neck : Supple, no JVD, no masses noted, no carotid bruit.   Lungs : Breath sounds with good air entry bilaterally, no wheezes or rales, no accessory muscle use. CVS : Rhythm rate regular, S1+, S2+, no murmur or gallop. Monitor showing sinus rhythm. Abdomen : Soft, nontender, no organomegaly, bowel sounds active. Extremities : No edema noted,  pedal pulses palpable. Musculoskeletal : Fair range of motion, no joint swelling or effusion, muscle tone and power appears normal.   Skin : Moist, warm, no pathological rash. Lymphatic : No cervical lymphadenopathy. Neurological : Awake, alert, oriented to time place person. No neurological deficits. Psychiatric : Mood and affect appears appropriate to the situation. Data Review:   Recent Results (from the past 24 hour(s))   CBC WITH AUTOMATED DIFF    Collection Time: 04/27/21  6:43 PM   Result Value Ref Range    WBC 7.8 3.6 - 11.0 K/uL    RBC 4.82 3.80 - 5.20 M/uL    HGB 15.4 11.5 - 16.0 g/dL    HCT 45.7 35.0 - 47.0 %    MCV 94.8 80.0 - 99.0 FL    MCH 32.0 26.0 - 34.0 PG    MCHC 33.7 30.0 - 36.5 g/dL    RDW 14.7 (H) 11.5 - 14.5 %    PLATELET 099 670 - 294 K/uL    MPV 10.4 8.9 - 12.9 FL    NRBC 0.0 0  WBC    ABSOLUTE NRBC 0.00 0.00 - 0.01 K/uL    NEUTROPHILS 60 32 - 75 %    LYMPHOCYTES 29 12 - 49 %    MONOCYTES 10 5 - 13 %    EOSINOPHILS 1 0 - 7 %    BASOPHILS 0 0 - 1 %    IMMATURE GRANULOCYTES 0 0.0 - 0.5 %    ABS. NEUTROPHILS 4.7 1.8 - 8.0 K/UL    ABS. LYMPHOCYTES 2.2 0.8 - 3.5 K/UL    ABS. MONOCYTES 0.8 0.0 - 1.0 K/UL    ABS. EOSINOPHILS 0.1 0.0 - 0.4 K/UL    ABS. BASOPHILS 0.0 0.0 - 0.1 K/UL    ABS. IMM.  GRANS. 0.0 0.00 - 0.04 K/UL    DF AUTOMATED     METABOLIC PANEL, COMPREHENSIVE    Collection Time: 04/27/21  6:43 PM   Result Value Ref Range    Sodium 131 (L) 136 - 145 mmol/L    Potassium 5.5 (H) 3.5 - 5.1 mmol/L    Chloride 104 97 - 108 mmol/L    CO2 21 21 - 32 mmol/L    Anion gap 6 5 - 15 mmol/L    Glucose 97 65 - 100 mg/dL    BUN 25 (H) 6 - 20 mg/dL    Creatinine 1.35 (H) 0.55 - 1.02 mg/dL    BUN/Creatinine ratio 19 12 - 20      GFR est AA 47 (L) >60 ml/min/1.73m2    GFR est non-AA 38 (L) >60 ml/min/1.73m2    Calcium 9.3 8.5 - 10.1 mg/dL    Bilirubin, total 0.4 0.2 - 1.0 mg/dL    AST (SGOT) 60 (H) 15 - 37 U/L    ALT (SGPT) 26 12 - 78 U/L    Alk. phosphatase 108 45 - 117 U/L    Protein, total 7.9 6.4 - 8.2 g/dL    Albumin 3.5 3.5 - 5.0 g/dL    Globulin 4.4 (H) 2.0 - 4.0 g/dL    A-G Ratio 0.8 (L) 1.1 - 2.2     CK W/ REFLX CKMB    Collection Time: 04/27/21  6:43 PM   Result Value Ref Range    .0 (H) 26 - 192 ng/mL   TROPONIN I    Collection Time: 04/27/21  6:43 PM   Result Value Ref Range    Troponin-I, Qt. <0.05 <0.05 ng/mL   CK-MB,QUANT. Collection Time: 04/27/21  6:43 PM   Result Value Ref Range    CK - MB 3.9 (H) <3.6 ng/mL    CK-MB Index 1.8     PROTHROMBIN TIME + INR    Collection Time: 04/27/21  7:00 PM   Result Value Ref Range    Prothrombin time 16.6 (H) 11.9 - 14.7 sec    INR 1.4 (H) 0.9 - 1.1     PTT    Collection Time: 04/27/21  7:00 PM   Result Value Ref Range    aPTT 30.7 21.2 - 34.1 sec    aPTT, therapeutic range   sec   GLUCOSE, POC    Collection Time: 04/27/21 10:44 PM   Result Value Ref Range    Glucose (POC) 164 (H) 65 - 100 mg/dL    Performed by [facility]        Radiologic Studies :     CXR Results  (Last 48 hours)               04/27/21 1645  XR CHEST PORT Final result    Impression:  No significant change. Narrative:  Frontal radiograph of the chest 4:48 PM compared to 3/25 2021. INDICATION: Chest pain. Heart size is stable with a tortuous atherosclerotic aorta. Stable scoliosis of   the thoracolumbar spine. Bilateral shoulder replacements are noted. No   infiltrates or effusions. There is minimal atelectasis at the left lung base. No   pneumothorax. Assessment and Plan :     Chest pain: Initial cardiac enzymes negative.   History of coronary artery disease, will consult her primary cardiologist, as she had recently work-up did not order any except serial cardiac enzymes. Paroxysmal atrial fibrillation: On anticoagulation, recently had cardiac conversion. States she has A. fib for 15 years. On anticoagulation with Xarelto that is held as she did cardiac catheterization. Benign essential hypertension: Blood pressure is very well controlled, actually running low normal.  We will continue the home medications with no change. Diabetes mellitus type 2: On small dose of Metformin with good control, will hold it as patient may be scheduled for cardiac cath. We will keep her on Accu-Cheks with a sliding scale insulin, blood sugars are very well controlled at this time. History of CVA multiple times with coordination, depth perception and gait disturbance. Admitted for observation to cardiac telemetry, full CODE STATUS, home medications reviewed with nursing staff. Patient unable to verify with me. CC : Tete Olvera [place]  Signed By: Marcia Mckeon MD     April 28, 2021      This dictation was done by dragon, computer voice recognition software. Often unanticipated grammatical, syntax, [place] phones and other interpretive errors are inadvertently transcribed. Please excuse errors that have escaped final proofreading.

## 2021-04-28 NOTE — PROGRESS NOTES
Hospitalist Progress Note    Subjective:   Daily Progress Note: 4/28/2021 12:30 PM    Hospital Course:  Reji Lockwood is a 77-year-old  female with past medical history of CAD s/p stents confirmed cardiac cath 50% blockage a few years ago, atrial fibrillation, hypertension, hypercholesterolemia, diabetes mellitus, and history of CVA resented to the emergency room with a primary complaint of chest discomfort and pain at began 2 days prior to admission. She presented from Dr[facility] clinic to the ED for chest pain. She denies palpitations, orthopnea, or nocturnal dyspnea. In the ED, vitals temp 98.4, pulse 84 bpm, /72, SPO2 97% on room air. EKG showed a. fib rate 102 without ST elevations. Significant labs showing elevated CK. Troponin negative x2. BUN and creatinine elevated. Patient admitted to hospitalist services for NSTEMI, atypical chest pain. Cardiology consulted. Cardiac stress test pending. Subjective:    Patient seen and examined at bedside. She went for cardiac stress test this morning, results are pending. No new complaints at this time.     Current Facility-Administered Medications   Medication Dose Route Frequency    lidocaine 4 % patch 1 Patch  1 Patch TransDERmal Q24H    fenofibrate nanocrystallized (TRICOR) tablet 145 mg  145 mg Oral DAILY    metFORMIN (GLUCOPHAGE) tablet 1,000 mg  1,000 mg Oral BID WITH MEALS    vitamin B complex capsule 1 Cap  1 Cap Oral DAILY    atorvastatin (LIPITOR) tablet 40 mg  40 mg Oral DAILY    rivaroxaban (XARELTO) tablet 20 mg  20 mg Oral QHS    dilTIAZem ER (DILACOR XR) capsule 180 mg  180 mg Oral DAILY    multivitamin (ONE A DAY) tablet 1 Tab  1 Tab Oral DAILY    levothyroxine (SYNTHROID) tablet 25 mcg  25 mcg Oral ACB    sodium chloride (NS) flush 5-40 mL  5-40 mL IntraVENous Q8H    sodium chloride (NS) flush 5-40 mL  5-40 mL IntraVENous PRN    aspirin delayed-release tablet 81 mg  81 mg Oral DAILY    acetaminophen (TYLENOL) tablet 650 mg  650 mg Oral Q4H PRN    ondansetron (ZOFRAN ODT) tablet 4 mg  4 mg Oral Q4H PRN    insulin lispro (HUMALOG) injection   SubCUTAneous AC&HS    glucose chewable tablet 16 g  4 Tab Oral PRN    dextrose (D50W) injection syrg 12.5-25 g  25-50 mL IntraVENous PRN    glucagon (GLUCAGEN) injection 1 mg  1 mg IntraMUSCular PRN     Facility-Administered Medications Ordered in Other Encounters   Medication Dose Route Frequency    0.9% sodium chloride infusion  50 mL/hr IntraVENous CONTINUOUS        Review of Systems  Constitutional: No fevers, No chills, No sweats, No fatigue, No Weakness  Eyes: No redness  ENT: No nasal congestion, No sore throat, No voice change  Respiratory: + Shortness of Breath, No cough, No wheezing  Cardiovascular: + chest pain, No palpitations, No extremity edema  Gastrointestinal: No nausea, No vomiting, No diarrhea, No abdominal pain  Genitourinary: No frequency, No dysuria, No hematuria  Integument/breast: No skin lesions  Neurological:  No headaches, No dizziness      Objective:     Visit Vitals  BP (!) 137/94   Pulse 97   Temp 98.1 °F (36.7 °C)   Resp 19   Ht 5' 7\" (1.702 m)   Wt 88.9 kg (196 lb)   SpO2 98%   BMI 30.70 kg/m²      O2 Device: None (Room air)    Temp (24hrs), Av.2 °F (36.8 °C), Min:98 °F (36.7 °C), Max:98.4 °F (36.9 °C)      No intake/output data recorded. No intake/output data recorded. PHYSICAL EXAM:  Constitutional:  female. No acute distress  Skin: Extremities and face reveal no rashes. HEENT: Sclerae anicteric. PERRL. The neck is supple and no masses. Cardiovascular: Regular rate and rhythm. Normal S1/S2. No murmur, gallop, click, or rub. No JVD  Respiratory:  Clear breath sounds bilaterally with no wheezes, rales, or rhonchi. GI: Abdomen nondistended, soft, and nontender. Normal active bowel sounds. Rectal: Deferred   Musculoskeletal: No pitting edema of the lower legs.  Able to move all ext  Neurological:  Patient is alert and oriented x3. Cranial nerves II-XII grossly intact  Psychiatric: Mood appears appropriate       Data Review    Recent Results (from the past 24 hour(s))   CBC WITH AUTOMATED DIFF    Collection Time: 04/27/21  6:43 PM   Result Value Ref Range    WBC 7.8 3.6 - 11.0 K/uL    RBC 4.82 3.80 - 5.20 M/uL    HGB 15.4 11.5 - 16.0 g/dL    HCT 45.7 35.0 - 47.0 %    MCV 94.8 80.0 - 99.0 FL    MCH 32.0 26.0 - 34.0 PG    MCHC 33.7 30.0 - 36.5 g/dL    RDW 14.7 (H) 11.5 - 14.5 %    PLATELET 789 693 - 578 K/uL    MPV 10.4 8.9 - 12.9 FL    NRBC 0.0 0  WBC    ABSOLUTE NRBC 0.00 0.00 - 0.01 K/uL    NEUTROPHILS 60 32 - 75 %    LYMPHOCYTES 29 12 - 49 %    MONOCYTES 10 5 - 13 %    EOSINOPHILS 1 0 - 7 %    BASOPHILS 0 0 - 1 %    IMMATURE GRANULOCYTES 0 0.0 - 0.5 %    ABS. NEUTROPHILS 4.7 1.8 - 8.0 K/UL    ABS. LYMPHOCYTES 2.2 0.8 - 3.5 K/UL    ABS. MONOCYTES 0.8 0.0 - 1.0 K/UL    ABS. EOSINOPHILS 0.1 0.0 - 0.4 K/UL    ABS. BASOPHILS 0.0 0.0 - 0.1 K/UL    ABS. IMM. GRANS. 0.0 0.00 - 0.04 K/UL    DF AUTOMATED     METABOLIC PANEL, COMPREHENSIVE    Collection Time: 04/27/21  6:43 PM   Result Value Ref Range    Sodium 131 (L) 136 - 145 mmol/L    Potassium 5.5 (H) 3.5 - 5.1 mmol/L    Chloride 104 97 - 108 mmol/L    CO2 21 21 - 32 mmol/L    Anion gap 6 5 - 15 mmol/L    Glucose 97 65 - 100 mg/dL    BUN 25 (H) 6 - 20 mg/dL    Creatinine 1.35 (H) 0.55 - 1.02 mg/dL    BUN/Creatinine ratio 19 12 - 20      GFR est AA 47 (L) >60 ml/min/1.73m2    GFR est non-AA 38 (L) >60 ml/min/1.73m2    Calcium 9.3 8.5 - 10.1 mg/dL    Bilirubin, total 0.4 0.2 - 1.0 mg/dL    AST (SGOT) 60 (H) 15 - 37 U/L    ALT (SGPT) 26 12 - 78 U/L    Alk.  phosphatase 108 45 - 117 U/L    Protein, total 7.9 6.4 - 8.2 g/dL    Albumin 3.5 3.5 - 5.0 g/dL    Globulin 4.4 (H) 2.0 - 4.0 g/dL    A-G Ratio 0.8 (L) 1.1 - 2.2     CK W/ REFLX CKMB    Collection Time: 04/27/21  6:43 PM   Result Value Ref Range    .0 (H) 26 - 192 ng/mL   TROPONIN I    Collection Time: 04/27/21  6:43 PM   Result Value Ref Range    Troponin-I, Qt. <0.05 <0.05 ng/mL   CK-MB,QUANT. Collection Time: 04/27/21  6:43 PM   Result Value Ref Range    CK - MB 3.9 (H) <3.6 ng/mL    CK-MB Index 1.8     PROTHROMBIN TIME + INR    Collection Time: 04/27/21  7:00 PM   Result Value Ref Range    Prothrombin time 16.6 (H) 11.9 - 14.7 sec    INR 1.4 (H) 0.9 - 1.1     PTT    Collection Time: 04/27/21  7:00 PM   Result Value Ref Range    aPTT 30.7 21.2 - 34.1 sec    aPTT, therapeutic range   sec   GLUCOSE, POC    Collection Time: 04/27/21 10:44 PM   Result Value Ref Range    Glucose (POC) 164 (H) 65 - 100 mg/dL    Performed by Vinicius Mc    TROPONIN I    Collection Time: 04/28/21  3:08 AM   Result Value Ref Range    Troponin-I, Qt. <0.05 <0.05 ng/mL   GLUCOSE, POC    Collection Time: 04/28/21  9:23 AM   Result Value Ref Range    Glucose (POC) 108 (H) 65 - 100 mg/dL    Performed by u.sit [facility] STRESS TEST    Collection Time: 04/28/21 12:02 PM   Result Value Ref Range    Target  bpm    Baseline HR 85 bpm    Baseline  mmHg    Percent HR 85 %    Post peak  bpm    Stress Base Diastolic BP 94 mmHg    Stress Stage 1 Duration 1min min:sec    Stress Stage 1  bpm    Stress Stage 2 Duration 2min min:sec    Stress Stage 2  bpm    Stress Stage 2 /91 mmHg    Stress Stage 3 Duration 3min min:sec    Stress Stage 3  bpm    Stress Stage 3 /97 mmHg    Stress Stage 4 Duration 4min min:sec    Stress Stage 4  bpm    Stress Stage 4 /91 mmHg       XR CHEST PORT   Final Result   No significant change. US RETROPERITONEUM LTD    (Results Pending)       Active Problems:    Chest pain (4/27/2021)        Assessment/Plan:     1. Atypical chest pain  2. NSTEMI  3. Hx CAD s/p stenting  - EKG showed a. fib rate 102 without ST elevations. - Elevated CK. Troponin negative x2.    - Echo (4/20): EF 55-60%, normal LV systolic function, LAE  - CXR negative  - Cardiology consulted. - Cardiac stress test pending  - Cardiac monitoring    4. Proximal atrial fibrillation  - Continue home anticoagulation Xarelto 20 mg daily and ASA  - Recently had cardiac conversion   - Cardiac monitoring    5. Benign essential hypertension  - Continue home medicatinos    6. Diabetes mellitus type 2  - ISS and accu-checks    7. History of CVA x4  - Continue ASA and statin      DVT Prophylaxis: Full code  Code Status:  POA:    Care Plan discussed with: Patient and nursing    Total time spent with patient: >35 minutes.

## 2021-04-28 NOTE — ED NOTES
TRANSFER - OUT REPORT:    Verbal report given to Lesli Rice RN (name) on Ruiz Sawyer  being transferred to  (unit) for routine progression of care       Report consisted of patients Situation, Background, Assessment and   Recommendations(SBAR). Information from the following report(s) SBAR was reviewed with the receiving nurse. Lines:   Peripheral IV 04/27/21 Right Antecubital (Active)        Opportunity for questions and clarification was provided.       Patient transported with:   Registered Nurse

## 2021-04-28 NOTE — PROGRESS NOTES
Primary Nurse Leanna Guzmán and Poonam Dorman RN performed a dual skin assessment on this patient No impairment noted  Josemanuel score is 21          Problem: Unstable angina/NSTEMI: Day of Admission/Day 1  Goal: Activity/Safety  Outcome: Progressing Towards Goal  Note: Bed is in the lowest position and wheels are locked, call bell is within reach, bathroom light is on during evening hours, gripper socks are on and patient has been instructed to call out for assistance if needed. As of now, patient is free from falls and will continue to be monitored. Goal: Consults, if ordered  Outcome: Progressing Towards Goal  Note: Patient has a cardiac consult ordered  Goal: Nutrition/Diet  Outcome: Progressing Towards Goal  Note: Pt is tolerating her diet and is reporting no nausea  Goal: Respiratory  Outcome: Progressing Towards Goal  Note: Pt is on room air and her O2 saturations are above 90%       Bedside shift change [facility] ([facility]coming nurse) by Vicki Kendrick RN (offgoing nurse). Report included the following information SBAR, Kardex, ED Summary, Intake/Output, MAR, Accordion and Cardiac Rhythm A-Fib.

## 2021-04-29 VITALS
TEMPERATURE: 97.6 F | HEIGHT: 67 IN | HEART RATE: 112 BPM | OXYGEN SATURATION: 98 % | WEIGHT: 196 LBS | SYSTOLIC BLOOD PRESSURE: 103 MMHG | RESPIRATION RATE: 20 BRPM | BODY MASS INDEX: 30.76 KG/M2 | DIASTOLIC BLOOD PRESSURE: 56 MMHG

## 2021-04-29 LAB
ANION GAP SERPL CALC-SCNC: 4 MMOL/L (ref 5–15)
BUN SERPL-MCNC: 15 MG/DL (ref 6–20)
BUN/CREAT SERPL: 22 (ref 12–20)
CA-I BLD-MCNC: 9.3 MG/DL (ref 8.5–10.1)
CHLORIDE SERPL-SCNC: 107 MMOL/L (ref 97–108)
CO2 SERPL-SCNC: 27 MMOL/L (ref 21–32)
CREAT SERPL-MCNC: 0.68 MG/DL (ref 0.55–1.02)
ERYTHROCYTE [DISTWIDTH] IN BLOOD BY AUTOMATED COUNT: 14.3 % (ref 11.5–14.5)
GLUCOSE BLD STRIP.AUTO-MCNC: 157 MG/DL (ref 65–100)
GLUCOSE BLD STRIP.AUTO-MCNC: 85 MG/DL (ref 65–100)
GLUCOSE SERPL-MCNC: 98 MG/DL (ref 65–100)
HCT VFR BLD AUTO: 46.6 % (ref 35–47)
HGB BLD-MCNC: 15.7 G/DL (ref 11.5–16)
MCH RBC QN AUTO: 32 PG (ref 26–34)
MCHC RBC AUTO-ENTMCNC: 33.7 G/DL (ref 30–36.5)
MCV RBC AUTO: 95.1 FL (ref 80–99)
PERFORMED BY, TECHID: ABNORMAL
PERFORMED BY, TECHID: NORMAL
PLATELET # BLD AUTO: 309 K/UL (ref 150–400)
PMV BLD AUTO: 10.6 FL (ref 8.9–12.9)
POTASSIUM SERPL-SCNC: 3.8 MMOL/L (ref 3.5–5.1)
RBC # BLD AUTO: 4.9 M/UL (ref 3.8–5.2)
SODIUM SERPL-SCNC: 138 MMOL/L (ref 136–145)
WBC # BLD AUTO: 5.7 K/UL (ref 3.6–11)

## 2021-04-29 PROCEDURE — 74011250637 HC RX REV CODE- 250/637: Performed by: HOSPITALIST

## 2021-04-29 PROCEDURE — 85027 COMPLETE CBC AUTOMATED: CPT

## 2021-04-29 PROCEDURE — 74011000250 HC RX REV CODE- 250: Performed by: HOSPITALIST

## 2021-04-29 PROCEDURE — 80048 BASIC METABOLIC PNL TOTAL CA: CPT

## 2021-04-29 PROCEDURE — 99218 HC RM OBSERVATION: CPT

## 2021-04-29 PROCEDURE — 74011250637 HC RX REV CODE- 250/637: Performed by: STUDENT IN AN ORGANIZED HEALTH CARE EDUCATION/TRAINING PROGRAM

## 2021-04-29 PROCEDURE — 36415 COLL VENOUS BLD VENIPUNCTURE: CPT

## 2021-04-29 PROCEDURE — 82962 GLUCOSE BLOOD TEST: CPT

## 2021-04-29 RX ORDER — GUAIFENESIN 100 MG/5ML
81 LIQUID (ML) ORAL 3 TIMES DAILY
Qty: 15 TAB | Refills: 0 | Status: SHIPPED | OUTPATIENT
Start: 2021-04-29 | End: 2021-05-04

## 2021-04-29 RX ORDER — COLCHICINE 0.6 MG/1
0.6 CAPSULE ORAL DAILY
Qty: 30 CAP | Refills: 0 | Status: SHIPPED | OUTPATIENT
Start: 2021-04-29 | End: 2021-05-29

## 2021-04-29 RX ORDER — OXYCODONE AND ACETAMINOPHEN 5; 325 MG/1; MG/1
2 TABLET ORAL
Status: DISCONTINUED | OUTPATIENT
Start: 2021-04-29 | End: 2021-04-29 | Stop reason: HOSPADM

## 2021-04-29 RX ADMIN — LEVOTHYROXINE SODIUM 25 MCG: 0.03 TABLET ORAL at 08:31

## 2021-04-29 RX ADMIN — FENOFIBRATE 145 MG: 145 TABLET, FILM COATED ORAL at 08:31

## 2021-04-29 RX ADMIN — ATORVASTATIN CALCIUM 40 MG: 40 TABLET, FILM COATED ORAL at 08:30

## 2021-04-29 RX ADMIN — ASPIRIN 81 MG: 81 TABLET, COATED ORAL at 08:30

## 2021-04-29 RX ADMIN — Medication 1 CAPSULE: at 09:00

## 2021-04-29 RX ADMIN — ONDANSETRON 4 MG: 4 TABLET, ORALLY DISINTEGRATING ORAL at 10:26

## 2021-04-29 RX ADMIN — OXYCODONE AND ACETAMINOPHEN 1 TABLET: 5; 325 TABLET ORAL at 08:30

## 2021-04-29 RX ADMIN — DILTIAZEM HYDROCHLORIDE 180 MG: 180 CAPSULE, EXTENDED RELEASE ORAL at 08:30

## 2021-04-29 RX ADMIN — RIVAROXABAN 20 MG: 20 TABLET, FILM COATED ORAL at 13:34

## 2021-04-29 RX ADMIN — MULTIVITAMIN TABLET 1 TABLET: TABLET at 08:30

## 2021-04-29 RX ADMIN — METFORMIN HYDROCHLORIDE 1000 MG: 500 TABLET ORAL at 08:30

## 2021-04-29 NOTE — DISCHARGE SUMMARY
Hospitalist Discharge Summary     Patient ID:    Dean Hartley  [id number]  7 y.o.  1950    Admit date: 4/27/2021    Discharge date : 4/29/2021    Chronic Diagnoses:    Problem List as of 4/29/2021 Date Reviewed: 4/27/2021          Codes Class Noted - Resolved    Chest pain ICD-10-CM: R07.9  ICD-9-CM: 786.50  4/27/2021 - Present        A-fib ([facility] ICD-10-CM: I48.91  ICD-9-CM: 427.31  4/20/2021 - Present        Atrial fibrillation ([facility] ICD-10-CM: I48.91  ICD-9-CM: 427.31  2/26/2021 - Present        Atrial fibrillation with RVR ([facility] ICD-10-CM: I48.91  ICD-9-CM: 427.31  2/25/2021 - Present        Osteoarthritis ICD-10-CM: M19.90  ICD-9-CM: 715.90  6/9/2017 - Present        DM (diabetes mellitus) ([facility] ICD-10-CM: E11.9  ICD-9-CM: 250.00  6/9/2017 - Present        Retroperitoneal fibrosis ICD-10-CM: N13.5  ICD-9-CM: 593.4  6/9/2017 - Present        Chronic a-fib ([facility] ICD-10-CM: I48.20  ICD-9-CM: 427.31  6/9/2017 - Present        AAA (abdominal aortic aneurysm) ([facility] ICD-10-CM: I71.4  ICD-9-CM: 441.4  6/9/2017 - Present        Osteoarthritis (arthritis due to wear and tear of joints) ICD-10-CM: M19.90  ICD-9-CM: 715.90  8/26/2016 - Present          22    Final Diagnoses: Active Problems:    Chest pain (4/27/2021)        Reason for Hospitalization/Hospital Course:   Beronica Alicea is a 68-year-old  female with past medical history of CAD s/p stents confirmed cardiac cath 50% blockage a few years ago, atrial fibrillation, hypertension, hypercholesterolemia, diabetes mellitus, and history of CVA resented to the emergency room with a primary complaint of chest discomfort and pain at began 2 days prior to admission. She presented from Dr. [facility] clinic to the ED for chest pain. She denies palpitations, orthopnea, or nocturnal dyspnea. In the ED, vitals temp 98.4, pulse 84 bpm, /72, SPO2 97% on room air. EKG showed a. fib rate 102 without ST elevations.   Significant labs showing elevated CK. Troponin negative x2. BUN and creatinine elevated. Patient admitted to hospitalist services for rule out ACS, atypical chest pain. Cardiology consulted. Cardiac stress test revealing normal EF 66% no evidence of transient ischemic dilation, small nonreversible defect not affecting wall motion, low risk stress test.  Retroperitoneal ultrasound negative for acute findings. Chest pain appears to occur with breathing or deep breath. Most consistent with costochondritis. She has also been having stomach upset with nausea for several weeks. She is stable for discharge home we will send her with new prescription for colchicine and advised to take aspirin 81 mg 3 times a day x5 days. Would also advise follow-up in 1 month with GI for upper GI series outpatient. Will follow up with cardiology in 2 weeks and PCP as needed.       Discharge Medications:   Current Discharge Medication List      START taking these medications    Details   colchicine (MITIGARE) 0.6 mg capsule Take 1 Cap by mouth daily for 30 days. Qty: 30 Cap, Refills: 0      aspirin 81 mg chewable tablet Take 1 Tab by mouth three (3) times daily for 5 days. Qty: 15 Tab, Refills: 0         CONTINUE these medications which have NOT CHANGED    Details   lidocaine (LIDODERM) 5 % 1 Patch by TransDERmal route daily. oxyCODONE-acetaminophen (PERCOCET 10)  mg per tablet Take 1 Tab by mouth every six (6) hours as needed for Severe Pain.      dilTIAZem ER (TIAZAC) 180 mg capsule Take 180 mg by mouth daily. multivitamin (ONE A DAY) tablet Take 1 Tab by mouth daily. levothyroxine (SYNTHROID) 25 mcg tablet Take 20 mcg by mouth Daily (before breakfast). furosemide (LASIX) 20 mg tablet Take 20 mg by mouth daily. rivaroxaban (XARELTO) 20 mg tab tablet Take 20 mg by mouth nightly. atorvastatin (LIPITOR) 40 mg tablet Take 40 mg by mouth daily. aspirin delayed-release 81 mg tablet Take 81 mg by mouth daily. Pt states she frequently does not take this medicine      coenzyme q10 10 mg cap Take 1 Cap by mouth daily. b complex vitamins tablet Take 1 Tab by mouth daily. fenofibrate micronized (LOFIBRA) 200 mg capsule Take  by mouth nightly. metFORMIN (GLUCOPHAGE) 500 mg tablet Take 1,000 mg by mouth two (2) times daily (with meals). nitroglycerin (NITROSTAT) 0.4 mg SL tablet by SubLINGual route every five (5) minutes as needed for Chest Pain. EPINEPHrine (EPIPEN) 0.3 mg/0.3 mL injection 0.3 mg by IntraMUSCular route once as needed. STOP taking these medications       isosorbide mononitrate (ISMO, MONOKET) 20 mg tablet Comments:   Reason for Stopping:         dilTIAZem ER (DILACOR XR) 180 mg capsule Comments:   Reason for Stopping:         ondansetron (ZOFRAN ODT) 4 mg disintegrating tablet Comments:   Reason for Stopping: Follow up Care:    [facility] [place] [facility]  in 1-2 weeks. Follow-up Information     Follow up With Specialties Details Why [address] [facility], [facility], [address] In 1 week  [address]  [place] [address]  [phone]      Violette Glez MD Cardiology, Cardio Vascular Surgery In 2 weeks  [address]      Patric Beatty MD Gastroenterology In 1 month Upper GI work-up for ongoing chest pain and stomach pain [address] [address] [phone]              Patient Follow Up Instructions: Activity: Activity as tolerated  Diet:  Cardiac Diet    Condition at Discharge:  Stable  __________________________________________________________________    Disposition  Home or Self Care  ____________________________________________________________________    Code Status:  Full Code  ___________________________________________________________________    Discharge Exam:  Patient seen and examined by me on discharge day.   Pertinent Findings:  Gen:    Not in distress  Chest: Clear lungs  CVS:   Regular rhythm.   No edema  Abd:  Soft, not distended, not tender  Neuro:  Alert    CONSULTATIONS: Cardiology    Significant Diagnostic Studies:   Recent Results (from the past 24 hour(s))   NUCLEAR CARDIAC STRESS TEST    Collection Time: 04/28/21 12:41 PM   Result Value Ref Range    Target  bpm    Baseline HR 85 bpm    Baseline  mmHg    Percent HR 85 %    Post peak  bpm    Stress Base Diastolic BP 94 mmHg    Stress Stage 1 Duration 1min min:sec    Stress Stage 1  bpm    Stress Stage 2 Duration 2min min:sec    Stress Stage 2  bpm    Stress Stage 2 /91 mmHg    Stress Stage 3 Duration 3min min:sec    Stress Stage 3  bpm    Stress Stage 3 /97 mmHg    Stress Stage 4 Duration 4min min:sec    Stress Stage 4  bpm    Stress Stage 4 /91 mmHg   GLUCOSE, POC    Collection Time: 04/28/21  1:20 PM   Result Value Ref Range    Glucose (POC) 118 (H) 65 - 100 mg/dL    Performed by Ancelmo Siu    TROPONIN I    Collection Time: 04/28/21  1:25 PM   Result Value Ref Range    Troponin-I, Qt. <0.05 <5.34 ng/mL   METABOLIC PANEL, BASIC    Collection Time: 04/28/21  1:25 PM   Result Value Ref Range    Sodium 137 136 - 145 mmol/L    Potassium 3.8 3.5 - 5.1 mmol/L    Chloride 105 97 - 108 mmol/L    CO2 24 21 - 32 mmol/L    Anion gap 8 5 - 15 mmol/L    Glucose 119 (H) 65 - 100 mg/dL    BUN 20 6 - 20 mg/dL    Creatinine 0.79 0.55 - 1.02 mg/dL    BUN/Creatinine ratio 25 (H) 12 - 20      GFR est AA >60 >60 ml/min/1.73m2    GFR est non-AA >60 >60 ml/min/1.73m2    Calcium 9.6 8.5 - 10.1 mg/dL   CBC W/O DIFF    Collection Time: 04/28/21  1:25 PM   Result Value Ref Range    WBC 7.0 3.6 - 11.0 K/uL    RBC 4.85 3.80 - 5.20 M/uL    HGB 15.7 11.5 - 16.0 g/dL    HCT 46.2 35.0 - 47.0 %    MCV 95.3 80.0 - 99.0 FL    MCH 32.4 26.0 - 34.0 PG    MCHC 34.0 30.0 - 36.5 g/dL    RDW 14.5 11.5 - 14.5 %    PLATELET 220 160 - 989 K/uL    MPV 10.4 8.9 - 12.9 FL MAGNESIUM    Collection Time: 04/28/21  1:25 PM   Result Value Ref Range    Magnesium 1.7 1.6 - 2.4 mg/dL   TSH 3RD GENERATION    Collection Time: 04/28/21  1:25 PM   Result Value Ref Range    TSH 0.89 0.36 - 3.74 uIU/mL   GLUCOSE, POC    Collection Time: 04/28/21  4:43 PM   Result Value Ref Range    Glucose (POC) 97 65 - 100 mg/dL    Performed by Tessie VAZQUEZ    SED RATE (ESR)    Collection Time: 04/28/21  9:15 PM   Result Value Ref Range    Sed rate, automated 12 mm/hr   METABOLIC PANEL, BASIC    Collection Time: 04/29/21  7:00 AM   Result Value Ref Range    Sodium 138 136 - 145 mmol/L    Potassium 3.8 3.5 - 5.1 mmol/L    Chloride 107 97 - 108 mmol/L    CO2 27 21 - 32 mmol/L    Anion gap 4 (L) 5 - 15 mmol/L    Glucose 98 65 - 100 mg/dL    BUN 15 6 - 20 mg/dL    Creatinine 0.68 0.55 - 1.02 mg/dL    BUN/Creatinine ratio 22 (H) 12 - 20      GFR est AA >60 >60 ml/min/1.73m2    GFR est non-AA >60 >60 ml/min/1.73m2    Calcium 9.3 8.5 - 10.1 mg/dL   CBC W/O DIFF    Collection Time: 04/29/21  7:00 AM   Result Value Ref Range    WBC 5.7 3.6 - 11.0 K/uL    RBC 4.90 3.80 - 5.20 M/uL    HGB 15.7 11.5 - 16.0 g/dL    HCT 46.6 35.0 - 47.0 %    MCV 95.1 80.0 - 99.0 FL    MCH 32.0 26.0 - 34.0 PG    MCHC 33.7 30.0 - 36.5 g/dL    RDW 14.3 11.5 - 14.5 %    PLATELET 619 147 - 072 K/uL    MPV 10.6 8.9 - 12.9 FL   GLUCOSE, POC    Collection Time: 04/29/21  7:28 AM   Result Value Ref Range    Glucose (POC) 85 65 - 100 mg/dL    Performed by Paul Das    GLUCOSE, POC    Collection Time: 04/29/21 12:02 PM   Result Value Ref Range    Glucose (POC) 157 (H) 65 - 100 mg/dL    Performed by Paul Das       RETROPERITONEUM LTD   Final Result   Atherosclerotic changes of aorta, without definite focal aneurysm. XR CHEST PORT   Final Result   No significant change.           Time spent in direct and indirect care including coordination of services: Greater than 35 minutes    Signed:  Stephan Ch NP  4/29/2021  12:20 PM

## 2021-04-29 NOTE — PROGRESS NOTES
Progress Note    Patient: Parker Flores MRN: [id number]  SSN: [id number]    YOB: 1950  Age: 66 y.o. Sex: female      Admit Date: 4/27/2021    LOS: 0 days     Subjective:     Patient chest pain is most suggestive of costochondritis. Objective:     Vitals:    04/28/21 2014 04/29/21 0022 04/29/21 0436 04/29/21 0726   BP: 133/77 131/75 120/78 125/84   Pulse: 86 89 72 96   Resp: 20 20 20 20   Temp: 98.1 °F (36.7 °C) 97.4 °F (36.3 °C) 97.4 °F (36.3 °C) 98.1 °F (36.7 °C)   SpO2: 97% 97% 95% 98%   Weight:       Height:            Intake and Output:  Current Shift: No intake/output data recorded. Last three shifts: No intake/output data recorded. Physical Exam:   General:  Alert, cooperative, no distress, appears stated age. Eyes:  Conjunctivae/corneas clear. PERRL, EOMs intact. Fundi benign   Ears:  Normal TMs and external ear canals both ears. Nose: Nares normal. Septum midline. Mucosa normal. No drainage or sinus tenderness. Mouth/Throat: Lips, mucosa, and tongue normal. Teeth and gums normal.   Neck: Supple, symmetrical, trachea midline, no adenopathy, thyroid: no enlargment/tenderness/nodules, no carotid bruit and no JVD. Back:   Symmetric, no curvature. ROM normal. No CVA tenderness. Lungs:   Clear to auscultation bilaterally. Chest is tender to palpation   Heart:  Regular rate and rhythm, S1, S2 normal, no murmur, click, rub or gallop. Abdomen:   Soft, non-tender. Bowel sounds normal. No masses,  No organomegaly. Extremities: Extremities normal, atraumatic, no cyanosis or edema. Pulses: 2+ and symmetric all extremities. Skin: Skin color, texture, turgor normal. No rashes or lesions   Lymph nodes: Cervical, supraclavicular, and axillary nodes normal.   Neurologic: CNII-XII intact. Normal strength, sensation and reflexes throughout. Lab/Data Review: All lab results for the last 24 hours reviewed.          Assessment:     Active Problems:    Chest pain (4/27/2021)    Mrs. Joi Burr is a 52-year-old white female with:  1. Atrial fibrillation with rapid ventricular rate  2. PVC  3. Heart failure with unknown ejection fraction with lower extremity swelling  4. Nonobstructive coronary artery disease  5. Mixed hyperlipidemia  6. Obesity  7. Secondhand smoker  8. Diabetes mellitus  9. Degenerative joint disease status post bilateral shoulder replacement  10. AAA  11. Retroperitoneal fibrosis  12. Chronic fatigue syndrome  13. Tinnitus    Plan:   Chest pain is only when she takes a deep breath. When she was getting the abdominal ultrasound yesterday she was feeling very tender. Okay to discharge from cardiac standpoint. Stress test was negative for ischemia. No focal aneurysm noted in the abdominal aorta. There was atherosclerotic changes noted. Will start colchicine. Okay to use adult aspirin 3 times a day for 5 days. Consider upper GI endoscopy. I will see patient in 2 weeks after discharge or sooner if needed.       Signed By: Tashia Shields MD     April 29, 2021

## 2021-04-29 NOTE — PROGRESS NOTES
Hospitalist Progress Note    Subjective:   Daily Progress Note: 4/29/2021 12:30 PM    Hospital Course:  Alysia Harry is a 75-year-old  female with past medical history of CAD s/p stents confirmed cardiac cath 50% blockage a few years ago, atrial fibrillation, hypertension, hypercholesterolemia, diabetes mellitus, and history of CVA resented to the emergency room with a primary complaint of chest discomfort and pain at began 2 days prior to admission. She presented from Dr. [facility] to the ED for chest pain. She denies palpitations, orthopnea, or nocturnal dyspnea. In the ED, vitals temp 98.4, pulse 84 bpm, /72, SPO2 97% on room air. EKG showed a. fib rate 102 without ST elevations. Significant labs showing elevated CK. Troponin negative x2. BUN and creatinine elevated. Patient admitted to hospitalist services for rule out ACS, atypical chest pain. Cardiology consulted. Cardiac stress test revealing normal EF 66% no evidence of transient ischemic dilation, small nonreversible defect not affecting wall motion, low risk stress test.  Retroperitoneal ultrasound negative for acute findings. Subjective:    Patient seen and examined at bedside. She continues to complain of chest pain noted when she breathes. She is concerned about the retroperitoneal aortic aneurysm or recurrent cancerous growth.     Current Facility-Administered Medications   Medication Dose Route Frequency    oxyCODONE-acetaminophen (PERCOCET) 5-325 mg per tablet 2 Tab  2 Tab Oral Q6H PRN    lidocaine 4 % patch 1 Patch  1 Patch TransDERmal Q24H    fenofibrate nanocrystallized (TRICOR) tablet 145 mg  145 mg Oral DAILY    metFORMIN (GLUCOPHAGE) tablet 1,000 mg  1,000 mg Oral BID WITH MEALS    vitamin B complex capsule 1 Cap  1 Cap Oral DAILY    atorvastatin (LIPITOR) tablet 40 mg  40 mg Oral DAILY    rivaroxaban (XARELTO) tablet 20 mg  20 mg Oral QHS    dilTIAZem ER (DILACOR XR) capsule 180 mg  180 mg Oral DAILY    multivitamin (ONE A DAY) tablet 1 Tab  1 Tab Oral DAILY    levothyroxine (SYNTHROID) tablet 25 mcg  25 mcg Oral ACB    sodium chloride (NS) flush 5-40 mL  5-40 mL IntraVENous Q8H    sodium chloride (NS) flush 5-40 mL  5-40 mL IntraVENous PRN    aspirin delayed-release tablet 81 mg  81 mg Oral DAILY    acetaminophen (TYLENOL) tablet 650 mg  650 mg Oral Q4H PRN    ondansetron (ZOFRAN ODT) tablet 4 mg  4 mg Oral Q4H PRN    insulin lispro (HUMALOG) injection   SubCUTAneous AC&HS    glucose chewable tablet 16 g  4 Tab Oral PRN    dextrose (D50W) injection syrg 12.5-25 g  25-50 mL IntraVENous PRN    glucagon (GLUCAGEN) injection 1 mg  1 mg IntraMUSCular PRN        Review of Systems  Constitutional: No fevers, No chills, No sweats, No fatigue, No Weakness  Eyes: No redness  ENT: No nasal congestion, No sore throat, No voice change  Respiratory: + Shortness of Breath, No cough, No wheezing  Cardiovascular: + chest pain, No palpitations, No extremity edema  Gastrointestinal: No nausea, No vomiting, No diarrhea, No abdominal pain  Genitourinary: No frequency, No dysuria, No hematuria  Integument/breast: No skin lesions  Neurological:  No headaches, No dizziness      Objective:     Visit Vitals  /84 (BP 1 Location: Right upper arm, BP Patient Position: At rest)   Pulse 96   Temp 98.1 °F (36.7 °C)   Resp 20   Ht 5' 7\" (1.702 m)   Wt 88.9 kg (196 lb)   SpO2 98%   BMI 30.70 kg/m²      O2 Device: None (Room air)    Temp (24hrs), Av.9 °F (36.6 °C), Min:97.4 °F (36.3 °C), Max:98.4 °F (36.9 °C)      No intake/output data recorded. No intake/output data recorded. PHYSICAL EXAM:  Constitutional:  female. No acute distress  Skin: Extremities and face reveal no rashes. HEENT: Sclerae anicteric. PERRL. The neck is supple and no masses. Cardiovascular: Regular rate and rhythm. Normal S1/S2. No murmur, gallop, click, or rub.   No JVD  Respiratory:  Clear breath sounds bilaterally with no wheezes, rales, or rhonchi. GI: Abdomen nondistended, soft, and nontender. Normal active bowel sounds. Rectal: Deferred   Musculoskeletal: No pitting edema of the lower legs. Able to move all ext  Neurological:  Patient is alert and oriented x3.  Cranial nerves II-XII grossly intact  Psychiatric: Mood appears appropriate       Data Review    Recent Results (from the past 24 hour(s))   NUCLEAR CARDIAC STRESS TEST    Collection Time: 04/28/21 12:41 PM   Result Value Ref Range    Target  bpm    Baseline HR 85 bpm    Baseline  mmHg    Percent HR 85 %    Post peak  bpm    Stress Base Diastolic BP 94 mmHg    Stress Stage 1 Duration 1min min:sec    Stress Stage 1  bpm    Stress Stage 2 Duration 2min min:sec    Stress Stage 2  bpm    Stress Stage 2 /91 mmHg    Stress Stage 3 Duration 3min min:sec    Stress Stage 3  bpm    Stress Stage 3 /97 mmHg    Stress Stage 4 Duration 4min min:sec    Stress Stage 4  bpm    Stress Stage 4 /91 mmHg   GLUCOSE, POC    Collection Time: 04/28/21  1:20 PM   Result Value Ref Range    Glucose (POC) 118 (H) 65 - 100 mg/dL    Performed by Claus Paz    TROPONIN I    Collection Time: 04/28/21  1:25 PM   Result Value Ref Range    Troponin-I, Qt. <0.05 <8.44 ng/mL   METABOLIC PANEL, BASIC    Collection Time: 04/28/21  1:25 PM   Result Value Ref Range    Sodium 137 136 - 145 mmol/L    Potassium 3.8 3.5 - 5.1 mmol/L    Chloride 105 97 - 108 mmol/L    CO2 24 21 - 32 mmol/L    Anion gap 8 5 - 15 mmol/L    Glucose 119 (H) 65 - 100 mg/dL    BUN 20 6 - 20 mg/dL    Creatinine 0.79 0.55 - 1.02 mg/dL    BUN/Creatinine ratio 25 (H) 12 - 20      GFR est AA >60 >60 ml/min/1.73m2    GFR est non-AA >60 >60 ml/min/1.73m2    Calcium 9.6 8.5 - 10.1 mg/dL   CBC W/O DIFF    Collection Time: 04/28/21  1:25 PM   Result Value Ref Range    WBC 7.0 3.6 - 11.0 K/uL    RBC 4.85 3.80 - 5.20 M/uL    HGB 15.7 11.5 - 16.0 g/dL    HCT 46.2 35.0 - 47.0 %    MCV 95.3 80.0 - 99.0 FL    MCH 32.4 26.0 - 34.0 PG    MCHC 34.0 30.0 - 36.5 g/dL    RDW 14.5 11.5 - 14.5 %    PLATELET 031 852 - 442 K/uL    MPV 10.4 8.9 - 12.9 FL   MAGNESIUM    Collection Time: 04/28/21  1:25 PM   Result Value Ref Range    Magnesium 1.7 1.6 - 2.4 mg/dL   TSH 3RD GENERATION    Collection Time: 04/28/21  1:25 PM   Result Value Ref Range    TSH 0.89 0.36 - 3.74 uIU/mL   GLUCOSE, POC    Collection Time: 04/28/21  4:43 PM   Result Value Ref Range    Glucose (POC) 97 65 - 100 mg/dL    Performed by Rusty VAZQEUZ    SED RATE (ESR)    Collection Time: 04/28/21  9:15 PM   Result Value Ref Range    Sed rate, automated 12 mm/hr   METABOLIC PANEL, BASIC    Collection Time: 04/29/21  7:00 AM   Result Value Ref Range    Sodium 138 136 - 145 mmol/L    Potassium 3.8 3.5 - 5.1 mmol/L    Chloride 107 97 - 108 mmol/L    CO2 27 21 - 32 mmol/L    Anion gap 4 (L) 5 - 15 mmol/L    Glucose 98 65 - 100 mg/dL    BUN 15 6 - 20 mg/dL    Creatinine 0.68 0.55 - 1.02 mg/dL    BUN/Creatinine ratio 22 (H) 12 - 20      GFR est AA >60 >60 ml/min/1.73m2    GFR est non-AA >60 >60 ml/min/1.73m2    Calcium 9.3 8.5 - 10.1 mg/dL   CBC W/O DIFF    Collection Time: 04/29/21  7:00 AM   Result Value Ref Range    WBC 5.7 3.6 - 11.0 K/uL    RBC 4.90 3.80 - 5.20 M/uL    HGB 15.7 11.5 - 16.0 g/dL    HCT 46.6 35.0 - 47.0 %    MCV 95.1 80.0 - 99.0 FL    MCH 32.0 26.0 - 34.0 PG    MCHC 33.7 30.0 - 36.5 g/dL    RDW 14.3 11.5 - 14.5 %    PLATELET 169 657 - 063 K/uL    MPV 10.6 8.9 - 12.9 FL   GLUCOSE, POC    Collection Time: 04/29/21  7:28 AM   Result Value Ref Range    Glucose (POC) 85 65 - 100 mg/dL    Performed by Abby Guzmán         RETROPERITONEUM LTD   Final Result   Atherosclerotic changes of aorta, without definite focal aneurysm. XR CHEST PORT   Final Result   No significant change. Active Problems:    Chest pain (4/27/2021)        Assessment/Plan:     1. Atypical chest pain  2. NSTEMI  3. Hx CAD s/p stenting  - EKG showed a. fib rate 102 without ST elevations. - Elevated CK. Troponin negative x2. - Echo (4/20): EF 55-60%, normal LV systolic function, LAE  - CXR negative  - Cardiology consulted. - Cardiac stress test negative for ischemia  - Cardiac monitoring    4. Proximal atrial fibrillation  - Continue home anticoagulation Xarelto 20 mg daily and ASA  - Recently had cardiac conversion   - Cardiac monitoring    5. Benign essential hypertension  - Continue home medicatinos    6. Diabetes mellitus type 2  - ISS and accu-checks    7. History of CVA x4  - Continue ASA and statin      DVT Prophylaxis: Full code  Code Status:  POA:    Care Plan discussed with: Patient and nursing    Total time spent with patient: >35 minutes.

## 2021-04-29 NOTE — PROGRESS NOTES
Discharge plan of care/case management plan validated with provider discharge order. Discharge medications reviewed with patient and appropriate educational materials and side effects teaching were provided. All peripheral Iv's have been taken out. Patient left at [phone].

## 2021-04-29 NOTE — PROGRESS NOTES
At approximately 2128, Dr. Enrique Vargas was called via telephone and informed about ultrasound result and asked if xarelto should be held. He gave telephone order to hold xarelto. 21:29 Xarelto was held as per telephone order.

## 2021-04-30 LAB
ATRIAL RATE: 120 BPM
ATRIAL RATE: 61 BPM
CALCULATED R AXIS, ECG10: 30 DEGREES
CALCULATED R AXIS, ECG10: 44 DEGREES
CALCULATED T AXIS, ECG11: 68 DEGREES
CALCULATED T AXIS, ECG11: 79 DEGREES
DIAGNOSIS, 93000: NORMAL
DIAGNOSIS, 93000: NORMAL
Q-T INTERVAL, ECG07: 302 MS
Q-T INTERVAL, ECG07: 358 MS
QRS DURATION, ECG06: 84 MS
QRS DURATION, ECG06: 92 MS
QTC CALCULATION (BEZET), ECG08: 393 MS
QTC CALCULATION (BEZET), ECG08: 442 MS
VENTRICULAR RATE, ECG03: 102 BPM
VENTRICULAR RATE, ECG03: 92 BPM

## 2021-08-03 PROBLEM — I48.91 ATRIAL FIBRILLATION (HCC): Status: RESOLVED | Noted: 2021-02-26 | Resolved: 2021-08-03

## 2021-08-03 PROBLEM — I48.91 A-FIB (HCC): Status: RESOLVED | Noted: 2021-04-20 | Resolved: 2021-08-03

## 2021-10-22 ENCOUNTER — HOSPITAL ENCOUNTER (EMERGENCY)
Age: 71
Discharge: HOME | End: 2021-10-23
Attending: FAMILY MEDICINE
Payer: MEDICARE

## 2021-10-22 DIAGNOSIS — I10 HYPERTENSION, UNSPECIFIED TYPE: Primary | ICD-10-CM

## 2021-10-22 LAB
BASOPHILS # BLD: 0.1 K/UL (ref 0–0.1)
BASOPHILS NFR BLD: 1 % (ref 0–1)
DIFFERENTIAL METHOD BLD: NORMAL
EOSINOPHIL # BLD: 0.1 K/UL (ref 0–0.4)
EOSINOPHIL NFR BLD: 2 % (ref 0–7)
ERYTHROCYTE [DISTWIDTH] IN BLOOD BY AUTOMATED COUNT: 13.9 % (ref 11.5–14.5)
HCT VFR BLD AUTO: 40.2 % (ref 35–47)
HGB BLD-MCNC: 13.5 G/DL (ref 11.5–16)
IMM GRANULOCYTES # BLD AUTO: 0 K/UL (ref 0–0.04)
IMM GRANULOCYTES NFR BLD AUTO: 0 % (ref 0–0.5)
LYMPHOCYTES # BLD: 2.6 K/UL (ref 0.8–3.5)
LYMPHOCYTES NFR BLD: 36 % (ref 12–49)
MCH RBC QN AUTO: 31.6 PG (ref 26–34)
MCHC RBC AUTO-ENTMCNC: 33.6 G/DL (ref 30–36.5)
MCV RBC AUTO: 94.1 FL (ref 80–99)
MONOCYTES # BLD: 0.6 K/UL (ref 0–1)
MONOCYTES NFR BLD: 9 % (ref 5–13)
NEUTS SEG # BLD: 3.9 K/UL (ref 1.8–8)
NEUTS SEG NFR BLD: 52 % (ref 32–75)
NRBC # BLD: 0 K/UL (ref 0–0.01)
NRBC BLD-RTO: 0 PER 100 WBC
PLATELET # BLD AUTO: 292 K/UL (ref 150–400)
PMV BLD AUTO: 10.5 FL (ref 8.9–12.9)
RBC # BLD AUTO: 4.27 M/UL (ref 3.8–5.2)
WBC # BLD AUTO: 7.3 K/UL (ref 3.6–11)

## 2021-10-22 PROCEDURE — 84484 ASSAY OF TROPONIN QUANT: CPT

## 2021-10-22 PROCEDURE — 99285 EMERGENCY DEPT VISIT HI MDM: CPT

## 2021-10-22 PROCEDURE — 85025 COMPLETE CBC W/AUTO DIFF WBC: CPT

## 2021-10-22 PROCEDURE — 93005 ELECTROCARDIOGRAM TRACING: CPT

## 2021-10-22 PROCEDURE — 36415 COLL VENOUS BLD VENIPUNCTURE: CPT

## 2021-10-22 PROCEDURE — 80053 COMPREHEN METABOLIC PANEL: CPT

## 2021-10-23 ENCOUNTER — APPOINTMENT (OUTPATIENT)
Dept: GENERAL RADIOLOGY | Age: 71
End: 2021-10-23
Attending: FAMILY MEDICINE
Payer: MEDICARE

## 2021-10-23 ENCOUNTER — APPOINTMENT (OUTPATIENT)
Dept: CT IMAGING | Age: 71
End: 2021-10-23
Attending: FAMILY MEDICINE
Payer: MEDICARE

## 2021-10-23 VITALS
DIASTOLIC BLOOD PRESSURE: 86 MMHG | WEIGHT: 185 LBS | OXYGEN SATURATION: 98 % | BODY MASS INDEX: 29.03 KG/M2 | RESPIRATION RATE: 20 BRPM | SYSTOLIC BLOOD PRESSURE: 139 MMHG | HEART RATE: 71 BPM | HEIGHT: 67 IN | TEMPERATURE: 97.7 F

## 2021-10-23 LAB
ALBUMIN SERPL-MCNC: 3.4 G/DL (ref 3.5–5)
ALBUMIN/GLOB SERPL: 1 {RATIO} (ref 1.1–2.2)
ALP SERPL-CCNC: 95 U/L (ref 45–117)
ALT SERPL-CCNC: 21 U/L (ref 12–78)
ANION GAP SERPL CALC-SCNC: 6 MMOL/L (ref 5–15)
AST SERPL W P-5'-P-CCNC: 14 U/L (ref 15–37)
BILIRUB SERPL-MCNC: 0.3 MG/DL (ref 0.2–1)
BUN SERPL-MCNC: 16 MG/DL (ref 6–20)
BUN/CREAT SERPL: 28 (ref 12–20)
CA-I BLD-MCNC: 8.7 MG/DL (ref 8.5–10.1)
CHLORIDE SERPL-SCNC: 101 MMOL/L (ref 97–108)
CO2 SERPL-SCNC: 31 MMOL/L (ref 21–32)
CREAT SERPL-MCNC: 0.57 MG/DL (ref 0.55–1.02)
GLOBULIN SER CALC-MCNC: 3.3 G/DL (ref 2–4)
GLUCOSE SERPL-MCNC: 100 MG/DL (ref 65–100)
MAGNESIUM SERPL-MCNC: 1.4 MG/DL (ref 1.6–2.4)
POTASSIUM SERPL-SCNC: 3.8 MMOL/L (ref 3.5–5.1)
PROT SERPL-MCNC: 6.7 G/DL (ref 6.4–8.2)
SODIUM SERPL-SCNC: 138 MMOL/L (ref 136–145)
TROPONIN-HIGH SENSITIVITY: 8 NG/L (ref 0–51)
TROPONIN-HIGH SENSITIVITY: 9 NG/L (ref 0–51)

## 2021-10-23 PROCEDURE — 83735 ASSAY OF MAGNESIUM: CPT

## 2021-10-23 PROCEDURE — 71045 X-RAY EXAM CHEST 1 VIEW: CPT

## 2021-10-23 PROCEDURE — 70450 CT HEAD/BRAIN W/O DYE: CPT

## 2021-10-23 PROCEDURE — 84484 ASSAY OF TROPONIN QUANT: CPT

## 2021-10-23 NOTE — ED PROVIDER NOTES
64-year-old with past medical history of A. fib, retroperitoneal fibrosis, chronic pain, diabetes, hyperlipidemia, CVA, thyroid disease who is here today complaining of elevated blood pressure to 200/180 which has been going on \"all day\" despite taking her antihypertensive. She is also complaining of numbness on the right side of her face, ataxia, and briefly experiencing left-sided chest pain after arriving in the emergency department.            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    Incontinence in female     Muscle weakness     Retroperitoneal fibrosis     aorta    Stroke ([address] 01/25/2016    x4  - only deficit is loss of right peripheral visit, emotional    Tinnitus        Past Surgical History:   Procedure Laterality Date    HX ADENOIDECTOMY      childhood    HX GYN  2010    tubes & ovaries removed    HX HEART CATHETERIZATION      X2- no blockages found    HX SHOULDER REPLACEMENT Left 2016    bilateral    HX TONSILLECTOMY      childhood         Family History:   Problem Relation Age of Onset    Dementia Mother     Heart Disease Mother     Coronary Artery Disease Mother         had open heart surgery    Diabetes Mother     Heart Disease Father     Heart Disease Maternal Grandmother     Heart Disease Maternal Grandfather     Heart Disease Paternal Grandmother     Heart Disease Paternal Grandfather     Heart Disease Brother        Social History     Socioeconomic History    Marital status:      Spouse name: Not on file    Number of children: Not on file    Years of education: Not on file    Highest education level: Not on file   Occupational History    Not on file   Tobacco Use    Smoking status: Never Smoker    Smokeless tobacco: Never Used   Substance and Sexual Activity    Alcohol use: No    Drug use: No    Sexual activity: Not Currently   Other Topics Concern    Not on file   Social History Narrative    Not on file     Social Determinants of Health     Financial Resource Strain:     Difficulty of Paying Living Expenses:    Food Insecurity:     Worried About Running Out of Food in the Last Year:     [address] in the Last Year:    Transportation Needs:     Lack of Transportation (Medical):  Lack of Transportation (Non-Medical):    Physical Activity:     Days of Exercise per Week:     Minutes of Exercise per Session:    Stress:     Feeling of Stress :    Social Connections:     Frequency of Communication with Friends and Family:     Frequency of Social Gatherings with Friends and Family:     Attends [employer] Services:     Active Member of Clubs or Organizations:     Attends Club or Organization Meetings:     Marital Status:    Intimate Partner Violence:     Fear of Current or Ex-Partner:     Emotionally Abused:     Physically Abused:     Sexually Abused: ALLERGIES: Bee sting [sting, bee]; Ciprofloxacin; Flagyl [metronidazole]; and Pcn [penicillins]    Review of Systems   Constitutional: Negative for chills and fever. HENT: Negative for rhinorrhea and sore throat. Eyes: Negative for pain and redness. Respiratory: Negative for cough and shortness of breath. Cardiovascular: Positive for chest pain. Negative for leg swelling. Gastrointestinal: Negative for abdominal pain, nausea and vomiting. Endocrine: Negative for polydipsia and polyuria. Genitourinary: Negative for decreased urine volume, dysuria and frequency. Musculoskeletal: Negative for arthralgias and back pain.    Skin: Negative for color change and rash. Allergic/Immunologic: Negative for environmental allergies, food allergies and immunocompromised state. Neurological: Positive for numbness. Negative for dizziness and headaches. +ataxia   Hematological: Negative for adenopathy. Does not bruise/bleed easily. Psychiatric/Behavioral: Negative for agitation and hallucinations. All other systems reviewed and are negative. Vitals:    10/22/21 2312   BP: (!) 136/90   Pulse: 69   Resp: 14   SpO2: 98%   Weight: 83.9 kg (185 lb)   Height: 5' 7\" (1.702 m)            Physical Exam  Vitals and nursing note reviewed. Constitutional:       Comments: Elderly, chronically ill-appearing, nontoxic-appearing, appears comfortable   HENT:      Head: Normocephalic and atraumatic. Right Ear: Tympanic membrane normal.      Left Ear: Tympanic membrane normal.      Nose: Nose normal. No rhinorrhea. Mouth/Throat:      Mouth: Mucous membranes are moist.      Pharynx: Oropharynx is clear. Eyes:      Extraocular Movements: Extraocular movements intact. Pupils: Pupils are equal, round, and reactive to light. Cardiovascular:      Rate and Rhythm: Normal rate and regular rhythm. Pulses: Normal pulses. Heart sounds: Normal heart sounds. Pulmonary:      Effort: Pulmonary effort is normal.      Breath sounds: Normal breath sounds. Abdominal:      General: Abdomen is flat. Palpations: Abdomen is soft. Musculoskeletal:         General: No deformity or signs of injury. Cervical back: Normal range of motion and neck supple. Skin:     General: Skin is warm and dry. Capillary Refill: Capillary refill takes less than 2 seconds. Neurological:      General: No focal deficit present. Mental Status: She is alert and oriented to person, place, and time. Cranial Nerves: No cranial nerve deficit. Sensory: No sensory deficit. Motor: No weakness.       Coordination: Coordination normal.   Psychiatric: Mood and Affect: Mood normal.         Behavior: Behavior normal.          EKG 2313: A. fib, heart rate 63, no ST changes, normal axis. EKG 0318: A. fib, heart rate 64, normal axis, no ST changes. Reevaluation 0420: Improved. Discussed results and dc planning. Discharging to home at this time with no rx.

## 2021-10-23 NOTE — DISCHARGE INSTRUCTIONS
Thank you! Thank you for allowing me to care for you in the emergency department. I sincerely hope that you are satisfied with your visit today. It is my goal to provide you with excellent care. Below you will find a list of your labs and imaging from your visit today. Should you have any questions regarding these results please do not hesitate to call the emergency department. Labs -     Recent Results (from the past 12 hour(s))   TROPONIN-HIGH SENSITIVITY    Collection Time: 10/22/21 11:30 PM   Result Value Ref Range    Troponin-High Sensitivity 8 0 - 51 ng/L   CBC WITH AUTOMATED DIFF    Collection Time: 10/22/21 11:30 PM   Result Value Ref Range    WBC 7.3 3.6 - 11.0 K/uL    RBC 4.27 3.80 - 5.20 M/uL    HGB 13.5 11.5 - 16.0 g/dL    HCT 40.2 35.0 - 47.0 %    MCV 94.1 80.0 - 99.0 FL    MCH 31.6 26.0 - 34.0 PG    MCHC 33.6 30.0 - 36.5 g/dL    RDW 13.9 11.5 - 14.5 %    PLATELET 219 669 - 719 K/uL    MPV 10.5 8.9 - 12.9 FL    NRBC 0.0 0.0  WBC    ABSOLUTE NRBC 0.00 0.00 - 0.01 K/uL    NEUTROPHILS 52 32 - 75 %    LYMPHOCYTES 36 12 - 49 %    MONOCYTES 9 5 - 13 %    EOSINOPHILS 2 0 - 7 %    BASOPHILS 1 0 - 1 %    IMMATURE GRANULOCYTES 0 0 - 0.5 %    ABS. NEUTROPHILS 3.9 1.8 - 8.0 K/UL    ABS. LYMPHOCYTES 2.6 0.8 - 3.5 K/UL    ABS. MONOCYTES 0.6 0.0 - 1.0 K/UL    ABS. EOSINOPHILS 0.1 0.0 - 0.4 K/UL    ABS. BASOPHILS 0.1 0.0 - 0.1 K/UL    ABS. IMM.  GRANS. 0.0 0.00 - 0.04 K/UL    DF AUTOMATED     METABOLIC PANEL, COMPREHENSIVE    Collection Time: 10/22/21 11:30 PM   Result Value Ref Range    Sodium 138 136 - 145 mmol/L    Potassium 3.8 3.5 - 5.1 mmol/L    Chloride 101 97 - 108 mmol/L    CO2 31 21 - 32 mmol/L    Anion gap 6 5 - 15 mmol/L    Glucose 100 65 - 100 mg/dL    BUN 16 6 - 20 mg/dL    Creatinine 0.57 0.55 - 1.02 mg/dL    BUN/Creatinine ratio 28 (H) 12 - 20      GFR est AA >60 >60 ml/min/1.73m2    GFR est non-AA >60 >60 ml/min/1.73m2    Calcium 8.7 8.5 - 10.1 mg/dL    Bilirubin, total 0.3 0.2 - 1.0 mg/dL    AST (SGOT) 14 (L) 15 - 37 U/L    ALT (SGPT) 21 12 - 78 U/L    Alk. phosphatase 95 45 - 117 U/L    Protein, total 6.7 6.4 - 8.2 g/dL    Albumin 3.4 (L) 3.5 - 5.0 g/dL    Globulin 3.3 2.0 - 4.0 g/dL    A-G Ratio 1.0 (L) 1.1 - 2.2     TROPONIN-HIGH SENSITIVITY    Collection Time: 10/23/21  3:30 AM   Result Value Ref Range    Troponin-High Sensitivity 9 0 - 51 ng/L   MAGNESIUM    Collection Time: 10/23/21  3:30 AM   Result Value Ref Range    Magnesium 1.4 (L) 1.6 - 2.4 mg/dL       Radiologic Studies -   CT HEAD WO CONT   Final Result   No acute intracranial abnormality. Prior infarcts of the right   temporal lobe in the left occipital lobe. Findings suggesting left frontal level   meningioma. Mild generalized atrophy with moderate nonspecific white matter   abnormality that may indicate chronic small vessel disease. XR CHEST PORT   Final Result   Cardiomegaly without cuate evidence of acute cardiac decompensation   or other acute cardiopulmonary finding. Other findings as above. CT Results  (Last 48 hours)                 10/23/21 0031  CT HEAD WO CONT Final result    Impression:  No acute intracranial abnormality. Prior infarcts of the right   temporal lobe in the left occipital lobe. Findings suggesting left frontal level   meningioma. Mild generalized atrophy with moderate nonspecific white matter   abnormality that may indicate chronic small vessel disease. Narrative:  HISTORY:  ataxia   Dose reduction technique: All CT scans at this facility are performed using dose reduction optimization   technique as appropriate on the exam including the following: Automated exposure   control, adjustment of the MA and/or KV according to patient size and/or use of   iterative reconstructive technique. TECHNIQUE: [Brain CT without contrast]   COMPARISON: [None]   LIMITATIONS: [None]       BRAIN: Moderate patchy areas of subcortical and periventricular white matter   hypodensity.  Focal encephalomalacia of the right temporal cortex and the left   occipital cortex No acute parenchymal hemorrhage, mass effect or midline shift. VENTRICLES: Mild prominence of the cerebral ventricles commensurate with the   degree of atrophy. EXTRA-AXIAL SPACES/SULCI: Mild generalized cerebral/cerebellar atrophy. 1.3   cm calcified extra-axial mass at the level of the inner table of the frontal   skull projecting over the left frontal lobe axial images 18-20. No acute   extra-axial hemorrhage or other acute extra-axial [id number]       CALVARIUM/SKULL BASE: Normal.           FACE/SINUSES: Visualized portions normal.           SOFT TISSUES: Normal.          OTHER: None. CXR Results  (Last 48 hours)                 10/23/21 0020  XR CHEST PORT Final result    Impression:  Cardiomegaly without cuate evidence of acute cardiac decompensation   or other acute cardiopulmonary finding. Other findings as above. Narrative:  HISTORY:  cp       TECHNIQUE:  XR CHEST PORT       COMPARISON: 4/27/2021   LIMITATIONS: None       TUBES/LINES: None       LUNG PARENCHYMA: Normal   TRACHEA/BRONCHI: Normal   PULMONARY VESSELS: Normal   PLEURA: Normal   HEART: Mildly enlarged cardiac silhouette   AORTIC SHADOW:Normal.     MEDIASTINUM: Normal   BONE/SOFT TISSUES: No acute abnormality. Bilateral shoulder arthroplasties   again noted. Thoracic-lumbar scoliosis again noted but less prominent than on   prior       OTHER: None                      If you feel that you have not received excellent quality care or timely care, please ask to speak to the nurse manager. Please choose us in the future for your continued health care needs. ------------------------------------------------------------------------------------------------------------  The exam and treatment you received in the Emergency Department were for an urgent problem and are not intended as complete care.  It is important that you follow-up with a doctor, nurse practitioner, or physician assistant to:  (1) confirm your diagnosis,  (2) re-evaluation of changes in your illness and treatment, and  (3) for ongoing care. If your symptoms become worse or you do not improve as expected and you are unable to reach your usual health care provider, you should return to the Emergency Department. We are available 24 hours a day. Please take your discharge instructions with you when you go to your follow-up appointment. If you have any problem arranging a follow-up appointment, contact the Emergency Department immediately. If a prescription has been provided, please have it filled as soon as possible to prevent a delay in treatment. Read the entire medication instruction sheet provided to you by the pharmacy. If you have any questions or reservations about taking the medication due to side effects or interactions with other medications, please call your primary care physician or contact the ER to speak with the charge nurse. Make an appointment with your family doctor or the physician you were referred to for follow-up of this visit as instructed on your discharge paperwork, as this is a mandatory follow-up. Return to the ER if you are unable to be seen or if you are unable to be seen in a timely manner. If you have any problem arranging the follow-up visit, contact the Emergency Department immediately.

## 2021-10-23 NOTE — ED TRIAGE NOTES
Pt c/o hypertension, meds not helping bring it down at home diltiazem 1 tablet q2 hrs 4 times, isosorbide 1 tablet (unsure of dose). Also c/o chest tightness, nausea, headache and facial numbness for 3 hours this morning.

## 2021-10-24 LAB
ATRIAL RATE: 49 BPM
CALCULATED R AXIS, ECG10: 56 DEGREES
CALCULATED T AXIS, ECG11: 78 DEGREES
DIAGNOSIS, 93000: NORMAL
Q-T INTERVAL, ECG07: 414 MS
QRS DURATION, ECG06: 90 MS
QTC CALCULATION (BEZET), ECG08: 423 MS
VENTRICULAR RATE, ECG03: 63 BPM

## (undated) DEVICE — SET GUIDEPIN DIA2.4MM DRL TIP DIA2.4MM GWIRE DIA2.8MM FOR

## (undated) DEVICE — SUTURE MCRYL SZ 3-0 L27IN ABSRB UD L19MM PS-2 3/8 CIR PRIM Y427H

## (undated) DEVICE — KENDALL SCD EXPRESS SLEEVES, KNEE LENGTH, MEDIUM: Brand: KENDALL SCD

## (undated) DEVICE — DEVON™ KNEE AND BODY STRAP 60" X 3" (1.5 M X 7.6 CM): Brand: DEVON

## (undated) DEVICE — STERILE POLYISOPRENE POWDER-FREE SURGICAL GLOVES: Brand: PROTEXIS

## (undated) DEVICE — YANKAUER OPEN TIP, NO VENT: Brand: ARGYLE

## (undated) DEVICE — Device

## (undated) DEVICE — COVER,MAYO STAND,STERILE: Brand: MEDLINE

## (undated) DEVICE — 3M™ IOBAN™ 2 ANTIMICROBIAL INCISE DRAPE 6651EZ: Brand: IOBAN™ 2

## (undated) DEVICE — (D)PREP SKN CHLRAPRP APPL 26ML -- CONVERT TO ITEM 371833

## (undated) DEVICE — BIT DRL L5IN DIA2MM STD ST S STL TWST BUSA

## (undated) DEVICE — BLADE ELECTRODE: Brand: EDGE

## (undated) DEVICE — STERILE POLYISOPRENE POWDER-FREE SURGICAL GLOVES WITH EMOLLIENT COATING: Brand: PROTEXIS

## (undated) DEVICE — 3000CC GUARDIAN II: Brand: GUARDIAN

## (undated) DEVICE — INFECTION CONTROL KIT SYS

## (undated) DEVICE — REM POLYHESIVE ADULT PATIENT RETURN ELECTRODE: Brand: VALLEYLAB

## (undated) DEVICE — T4 HOOD

## (undated) DEVICE — LIGHT HANDLE: Brand: DEVON

## (undated) DEVICE — ARGYLE FRAZIER SURGICAL SUCTION INSTRUMENT 10 FR/CH (3.3 MM): Brand: ARGYLE

## (undated) DEVICE — TIBURON SPLIT SHEET: Brand: CONVERTORS

## (undated) DEVICE — T-MAX DISPOSABLE FACE MASK 8 PER BOX

## (undated) DEVICE — SOLUTION IRRIG 3000ML 0.9% SOD CHL FLX CONT 0797208] ICU MEDICAL INC]

## (undated) DEVICE — RETRIEVER SUT ARTHSCP HOFFEE --

## (undated) DEVICE — STRYKER PERFORMANCE SERIES SAGITTAL BLADE: Brand: STRYKER PERFORMANCE SERIES

## (undated) DEVICE — SUTURE VCRL 2-0 L27IN ABSRB UD CP-2 L26MM 1/2 CIR REV CUT J869H

## (undated) DEVICE — CEMENT MIXING SYSTEM WITH FEMORAL BREAKWAY NOZZLE: Brand: REVOLUTION

## (undated) DEVICE — DERMABOND SKIN ADH 0.7ML -- DERMABOND ADVANCED 12/BX

## (undated) DEVICE — DRAPE,U/ SHT,SPLIT,PLAS,STERIL: Brand: MEDLINE

## (undated) DEVICE — SYRINGE CATH TIP 50ML

## (undated) DEVICE — SUTURE FIBERWIRE SZ 2 W/ TAPERED NEEDLE BLUE L38IN NONABSORB BLU L26.5MM 1/2 CIRCLE AR7200